# Patient Record
Sex: MALE | Race: WHITE | NOT HISPANIC OR LATINO | Employment: OTHER | URBAN - METROPOLITAN AREA
[De-identification: names, ages, dates, MRNs, and addresses within clinical notes are randomized per-mention and may not be internally consistent; named-entity substitution may affect disease eponyms.]

---

## 2019-08-01 ENCOUNTER — HOSPITAL ENCOUNTER (OUTPATIENT)
Facility: HOSPITAL | Age: 74
Discharge: HOME | End: 2019-08-02
Attending: EMERGENCY MEDICINE | Admitting: INTERNAL MEDICINE
Payer: MEDICARE

## 2019-08-01 DIAGNOSIS — I20.89 ANGINAL CHEST PAIN AT REST: ICD-10-CM

## 2019-08-01 DIAGNOSIS — R07.9 CHEST PAIN: Primary | ICD-10-CM

## 2019-08-01 PROBLEM — R13.10 DYSPHAGIA: Status: ACTIVE | Noted: 2019-08-01

## 2019-08-01 PROBLEM — N20.0 NEPHROLITHIASIS: Chronic | Status: ACTIVE | Noted: 2019-08-01

## 2019-08-01 PROBLEM — K57.30 DIVERTICULAR DISEASE OF COLON: Status: ACTIVE | Noted: 2019-08-01

## 2019-08-01 PROBLEM — J84.10 PULMONARY GRANULOMA: Status: ACTIVE | Noted: 2019-08-01

## 2019-08-01 PROBLEM — D69.6 THROMBOCYTOPENIA, UNSPECIFIED: Status: ACTIVE | Noted: 2019-08-01

## 2019-08-01 PROBLEM — D64.9 ANEMIA: Status: ACTIVE | Noted: 2019-08-01

## 2019-08-01 PROBLEM — K46.9 ABDOMINAL HERNIA: Status: ACTIVE | Noted: 2019-08-01

## 2019-08-01 LAB
ALBUMIN SERPL BCP-MCNC: 4 G/DL (ref 3.5–5.2)
ALP SERPL-CCNC: 47 U/L (ref 55–135)
ALT SERPL W/O P-5'-P-CCNC: 24 U/L (ref 10–44)
ANION GAP SERPL CALC-SCNC: 8 MMOL/L (ref 8–16)
AST SERPL-CCNC: 24 U/L (ref 10–40)
BASOPHILS # BLD AUTO: 0.04 K/UL (ref 0–0.2)
BASOPHILS NFR BLD: 0.6 % (ref 0–1.9)
BILIRUB SERPL-MCNC: 1.5 MG/DL (ref 0.1–1)
BILIRUB UR QL STRIP: NEGATIVE
BNP SERPL-MCNC: 95 PG/ML (ref 0–99)
BUN SERPL-MCNC: 16 MG/DL (ref 8–23)
CALCIUM SERPL-MCNC: 9.8 MG/DL (ref 8.7–10.5)
CHLORIDE SERPL-SCNC: 107 MMOL/L (ref 95–110)
CK MB SERPL-MCNC: 1.6 NG/ML (ref 0.1–6.5)
CK SERPL-CCNC: 41 U/L (ref 20–200)
CLARITY UR: CLEAR
CO2 SERPL-SCNC: 27 MMOL/L (ref 23–29)
COLOR UR: YELLOW
CREAT SERPL-MCNC: 1 MG/DL (ref 0.5–1.4)
DIFFERENTIAL METHOD: ABNORMAL
EOSINOPHIL # BLD AUTO: 0.2 K/UL (ref 0–0.5)
EOSINOPHIL NFR BLD: 2.4 % (ref 0–8)
ERYTHROCYTE [DISTWIDTH] IN BLOOD BY AUTOMATED COUNT: 11.9 % (ref 11.5–14.5)
EST. GFR  (AFRICAN AMERICAN): >60 ML/MIN/1.73 M^2
EST. GFR  (NON AFRICAN AMERICAN): >60 ML/MIN/1.73 M^2
GLUCOSE SERPL-MCNC: 87 MG/DL (ref 70–110)
GLUCOSE UR QL STRIP: NEGATIVE
HCT VFR BLD AUTO: 35.7 % (ref 40–54)
HGB BLD-MCNC: 12.4 G/DL (ref 14–18)
HGB UR QL STRIP: NEGATIVE
IMM GRANULOCYTES # BLD AUTO: 0.01 K/UL (ref 0–0.04)
IMM GRANULOCYTES NFR BLD AUTO: 0.2 % (ref 0–0.5)
KETONES UR QL STRIP: NEGATIVE
LEUKOCYTE ESTERASE UR QL STRIP: NEGATIVE
LYMPHOCYTES # BLD AUTO: 1.9 K/UL (ref 1–4.8)
LYMPHOCYTES NFR BLD: 29.3 % (ref 18–48)
MAGNESIUM SERPL-MCNC: 1.9 MG/DL (ref 1.6–2.6)
MCH RBC QN AUTO: 30.8 PG (ref 27–31)
MCHC RBC AUTO-ENTMCNC: 34.7 G/DL (ref 32–36)
MCV RBC AUTO: 89 FL (ref 82–98)
MONOCYTES # BLD AUTO: 0.7 K/UL (ref 0.3–1)
MONOCYTES NFR BLD: 10.9 % (ref 4–15)
NEUTROPHILS # BLD AUTO: 3.7 K/UL (ref 1.8–7.7)
NEUTROPHILS NFR BLD: 56.6 % (ref 38–73)
NITRITE UR QL STRIP: NEGATIVE
NRBC BLD-RTO: 0 /100 WBC
PH UR STRIP: 7 [PH] (ref 5–8)
PLATELET # BLD AUTO: 133 K/UL (ref 150–350)
PMV BLD AUTO: 12.3 FL (ref 9.2–12.9)
POTASSIUM SERPL-SCNC: 4.2 MMOL/L (ref 3.5–5.1)
PROT SERPL-MCNC: 7 G/DL (ref 6–8.4)
PROT UR QL STRIP: NEGATIVE
RBC # BLD AUTO: 4.03 M/UL (ref 4.6–6.2)
SODIUM SERPL-SCNC: 142 MMOL/L (ref 136–145)
SP GR UR STRIP: 1 (ref 1–1.03)
TROPONIN I SERPL DL<=0.01 NG/ML-MCNC: <0.03 NG/ML (ref 0.02–0.5)
TROPONIN I SERPL DL<=0.01 NG/ML-MCNC: <0.03 NG/ML (ref 0.02–0.5)
URN SPEC COLLECT METH UR: NORMAL
UROBILINOGEN UR STRIP-ACNC: NEGATIVE EU/DL
WBC # BLD AUTO: 6.59 K/UL (ref 3.9–12.7)

## 2019-08-01 PROCEDURE — 85025 COMPLETE CBC W/AUTO DIFF WBC: CPT

## 2019-08-01 PROCEDURE — 80053 COMPREHEN METABOLIC PANEL: CPT

## 2019-08-01 PROCEDURE — 25000003 PHARM REV CODE 250: Performed by: INTERNAL MEDICINE

## 2019-08-01 PROCEDURE — 63600175 PHARM REV CODE 636 W HCPCS: Performed by: INTERNAL MEDICINE

## 2019-08-01 PROCEDURE — 99285 EMERGENCY DEPT VISIT HI MDM: CPT | Mod: 25

## 2019-08-01 PROCEDURE — 84484 ASSAY OF TROPONIN QUANT: CPT | Mod: 91

## 2019-08-01 PROCEDURE — 83735 ASSAY OF MAGNESIUM: CPT

## 2019-08-01 PROCEDURE — G0378 HOSPITAL OBSERVATION PER HR: HCPCS

## 2019-08-01 PROCEDURE — 81003 URINALYSIS AUTO W/O SCOPE: CPT

## 2019-08-01 PROCEDURE — 82550 ASSAY OF CK (CPK): CPT

## 2019-08-01 PROCEDURE — 84484 ASSAY OF TROPONIN QUANT: CPT

## 2019-08-01 PROCEDURE — 82553 CREATINE MB FRACTION: CPT

## 2019-08-01 PROCEDURE — 93005 ELECTROCARDIOGRAM TRACING: CPT

## 2019-08-01 PROCEDURE — 83880 ASSAY OF NATRIURETIC PEPTIDE: CPT

## 2019-08-01 RX ORDER — CLOPIDOGREL BISULFATE 75 MG/1
75 TABLET ORAL DAILY
Status: DISCONTINUED | OUTPATIENT
Start: 2019-08-02 | End: 2019-08-02 | Stop reason: HOSPADM

## 2019-08-01 RX ORDER — MORPHINE SULFATE 4 MG/ML
4 INJECTION, SOLUTION INTRAMUSCULAR; INTRAVENOUS EVERY 4 HOURS PRN
Status: DISCONTINUED | OUTPATIENT
Start: 2019-08-01 | End: 2019-08-02 | Stop reason: HOSPADM

## 2019-08-01 RX ORDER — PANTOPRAZOLE SODIUM 40 MG/1
40 TABLET, DELAYED RELEASE ORAL DAILY
Status: DISCONTINUED | OUTPATIENT
Start: 2019-08-02 | End: 2019-08-02 | Stop reason: HOSPADM

## 2019-08-01 RX ORDER — TEMAZEPAM 7.5 MG/1
15 CAPSULE ORAL NIGHTLY PRN
COMMUNITY
End: 2021-10-21

## 2019-08-01 RX ORDER — MORPHINE SULFATE 2 MG/ML
2 INJECTION, SOLUTION INTRAMUSCULAR; INTRAVENOUS EVERY 4 HOURS PRN
Status: DISCONTINUED | OUTPATIENT
Start: 2019-08-01 | End: 2019-08-02 | Stop reason: HOSPADM

## 2019-08-01 RX ORDER — ISOSORBIDE MONONITRATE 60 MG/1
120 TABLET, EXTENDED RELEASE ORAL DAILY
Status: DISCONTINUED | OUTPATIENT
Start: 2019-08-02 | End: 2019-08-02 | Stop reason: HOSPADM

## 2019-08-01 RX ORDER — ATORVASTATIN CALCIUM 40 MG/1
40 TABLET, FILM COATED ORAL NIGHTLY
Status: DISCONTINUED | OUTPATIENT
Start: 2019-08-01 | End: 2019-08-02 | Stop reason: HOSPADM

## 2019-08-01 RX ORDER — ACETAMINOPHEN 325 MG/1
650 TABLET ORAL EVERY 4 HOURS PRN
Status: DISCONTINUED | OUTPATIENT
Start: 2019-08-01 | End: 2019-08-02 | Stop reason: HOSPADM

## 2019-08-01 RX ORDER — LISINOPRIL 5 MG/1
5 TABLET ORAL DAILY
Status: DISCONTINUED | OUTPATIENT
Start: 2019-08-02 | End: 2019-08-02 | Stop reason: HOSPADM

## 2019-08-01 RX ORDER — ASPIRIN 81 MG/1
81 TABLET ORAL DAILY
Status: DISCONTINUED | OUTPATIENT
Start: 2019-08-02 | End: 2019-08-02 | Stop reason: HOSPADM

## 2019-08-01 RX ORDER — ASPIRIN 325 MG
325 TABLET ORAL
Status: ACTIVE | OUTPATIENT
Start: 2019-08-01 | End: 2019-08-02

## 2019-08-01 RX ORDER — FLECAINIDE ACETATE 100 MG/1
100 TABLET ORAL EVERY 12 HOURS
Status: DISCONTINUED | OUTPATIENT
Start: 2019-08-01 | End: 2019-08-02 | Stop reason: HOSPADM

## 2019-08-01 RX ORDER — CITALOPRAM 20 MG/1
20 TABLET, FILM COATED ORAL DAILY
Status: DISCONTINUED | OUTPATIENT
Start: 2019-08-02 | End: 2019-08-02 | Stop reason: HOSPADM

## 2019-08-01 RX ORDER — CLOPIDOGREL BISULFATE 75 MG/1
75 TABLET ORAL DAILY
COMMUNITY
End: 2019-08-17 | Stop reason: HOSPADM

## 2019-08-01 RX ORDER — FOLIC ACID 1 MG/1
1 TABLET ORAL DAILY
Status: DISCONTINUED | OUTPATIENT
Start: 2019-08-02 | End: 2019-08-02 | Stop reason: HOSPADM

## 2019-08-01 RX ORDER — SODIUM CHLORIDE 0.9 % (FLUSH) 0.9 %
10 SYRINGE (ML) INJECTION
Status: DISCONTINUED | OUTPATIENT
Start: 2019-08-01 | End: 2019-08-02 | Stop reason: HOSPADM

## 2019-08-01 RX ORDER — TAMSULOSIN HYDROCHLORIDE 0.4 MG/1
0.4 CAPSULE ORAL DAILY
Status: DISCONTINUED | OUTPATIENT
Start: 2019-08-01 | End: 2019-08-02 | Stop reason: HOSPADM

## 2019-08-01 RX ADMIN — ATORVASTATIN CALCIUM 40 MG: 40 TABLET, FILM COATED ORAL at 11:08

## 2019-08-01 RX ADMIN — MORPHINE SULFATE 2 MG: 2 INJECTION, SOLUTION INTRAMUSCULAR; INTRAVENOUS at 11:08

## 2019-08-01 RX ADMIN — TAMSULOSIN HYDROCHLORIDE 0.4 MG: 0.4 CAPSULE ORAL at 11:08

## 2019-08-01 NOTE — ED NOTES
Presents to ER with c/o left flank pain that goes into left groin. Advised chest hurts when he moves around a lot. States he has been doing the yard work, house work and taking care of his bedridden wife, and has been under a lot of stress lately. also says has shortness of breath when he is up moving around. Said was seen here for similar issues 2 weeks ago. AAOx4

## 2019-08-01 NOTE — ED PROVIDER NOTES
Encounter Date: 8/1/2019       History     Chief Complaint   Patient presents with    Chest Pain     74-year-old male with a history of coronary artery disease status post CABG x2 with 7 cardiac stents presents today complaining of substernal chest pain patient reports currently substernal chest pain is described as a pressure and rates it as 3/10 patient reports pain is increased with activity and partially alleviated by rest.  Patient reports pain is consistent with previous heart attacks.  Patient reports that he has been in his usual state of health until about 2 days ago when these pains began.  Patient reports that pain has been worsening.  Patient also complains of pain in his left flank radiating down the left thigh which he attributes to sciatica.  Patient sees Dr. Grewal, he reports taking 1 baby aspirin but has not use nitroglycerin today        Review of patient's allergies indicates:  No Known Allergies  Past Medical History:   Diagnosis Date    Coronary artery disease     Encounter for blood transfusion     Hypertension      Past Surgical History:   Procedure Laterality Date    CARDIAC SURGERY      CABG X2    HERNIA REPAIR      VASCULAR SURGERY      sents X7     Family History   Problem Relation Age of Onset    Heart disease Father     Heart disease Brother     Cancer Brother     Hypertension Brother      Social History     Tobacco Use    Smoking status: Former Smoker     Packs/day: 0.25     Years: 50.00     Pack years: 12.50    Smokeless tobacco: Former User     Quit date: 7/2/2018    Tobacco comment: cigars   Substance Use Topics    Alcohol use: Not on file    Drug use: No     Review of Systems   Constitutional: Negative for fever.   HENT: Negative for congestion, rhinorrhea, sore throat and trouble swallowing.    Eyes: Negative for visual disturbance.   Respiratory: Negative for cough, chest tightness, shortness of breath and wheezing.    Cardiovascular: Positive for chest pain.  Negative for palpitations and leg swelling.   Gastrointestinal: Negative for abdominal distention, abdominal pain, constipation, diarrhea, nausea and vomiting.   Genitourinary: Positive for flank pain. Negative for difficulty urinating, dysuria and frequency.   Musculoskeletal: Negative for arthralgias, back pain, joint swelling and neck pain.   Skin: Negative for color change and rash.   Neurological: Negative for dizziness, syncope, speech difficulty, weakness, numbness and headaches.   All other systems reviewed and are negative.      Physical Exam     Initial Vitals [08/01/19 1703]   BP Pulse Resp Temp SpO2   132/81 79 20 98 °F (36.7 °C) 100 %      MAP       --         Physical Exam    Nursing note and vitals reviewed.  Constitutional: He appears well-developed and well-nourished. He is not diaphoretic. No distress.   HENT:   Head: Normocephalic and atraumatic.   Right Ear: External ear normal.   Left Ear: External ear normal.   Nose: Nose normal.   Mouth/Throat: Oropharynx is clear and moist. No oropharyngeal exudate.   Eyes: Conjunctivae and EOM are normal. Pupils are equal, round, and reactive to light. Right eye exhibits no discharge. Left eye exhibits no discharge. No scleral icterus.   Neck: Normal range of motion. Neck supple. No thyromegaly present. No tracheal deviation present. No JVD present.   Cardiovascular: Normal rate, regular rhythm, normal heart sounds and intact distal pulses. Exam reveals no gallop and no friction rub.    No murmur heard.  Pulmonary/Chest: Breath sounds normal. No stridor. No respiratory distress. He has no wheezes. He has no rhonchi. He has no rales. He exhibits no tenderness.   Abdominal: Soft. Bowel sounds are normal. He exhibits no distension and no mass. There is no tenderness. There is no rebound and no guarding.   Musculoskeletal: Normal range of motion. He exhibits no edema or tenderness.   Lymphadenopathy:     He has no cervical adenopathy.   Neurological: He is alert  and oriented to person, place, and time. He has normal strength and normal reflexes. He displays normal reflexes. No cranial nerve deficit or sensory deficit.   Skin: Skin is warm and dry. No rash noted. No erythema.         ED Course   Procedures  Labs Reviewed   CBC W/ AUTO DIFFERENTIAL - Abnormal; Notable for the following components:       Result Value    RBC 4.03 (*)     Hemoglobin 12.4 (*)     Hematocrit 35.7 (*)     Platelets 133 (*)     All other components within normal limits   COMPREHENSIVE METABOLIC PANEL - Abnormal; Notable for the following components:    Total Bilirubin 1.5 (*)     Alkaline Phosphatase 47 (*)     All other components within normal limits   TROPONIN I   B-TYPE NATRIURETIC PEPTIDE   MAGNESIUM   URINALYSIS    Narrative:     Collection Type->Urine, Clean Catch   B-TYPE NATRIURETIC PEPTIDE   PROTIME-INR   TSH   TROPONIN I   CK   CK-MB          Imaging Results          X-Ray Chest AP Portable (Final result)  Result time 08/01/19 19:24:00    Final result by Marvin Lee MD (08/01/19 19:24:00)                 Impression:      No acute pulmonary process.      Electronically signed by: Marvin Lee MD  Date:    08/01/2019  Time:    19:24             Narrative:    EXAMINATION:  XR CHEST AP PORTABLE    CLINICAL HISTORY:  Chest Pain;    COMPARISON:  07/08/2019    FINDINGS:  Cardiac silhouette size is stable from prior.  Patient is status post median sternotomy.  Hyperdense left lung nodule consistent with calcified granuloma is stable from prior.  No confluent airspace disease.  No large pleural effusion or pneumothorax.                                 Medical Decision Making:   History:   Old Medical Records: I decided to obtain old medical records.  Initial Assessment:   Emergent evaluation of a 74-year-old male presenting with chest pain differential diagnosis includes ACS, electrolyte abnormality, endocrine dysfunction, infection patient also has left flank pain differential diagnosis  includes kidney stone, sciatica, musculoskeletal pain                      Clinical Impression:       ICD-10-CM ICD-9-CM   1. Chest pain R07.9 786.50   2. Anginal chest pain at rest I20.8 413.0                                Juvenal Brown MD  08/02/19 0016

## 2019-08-02 ENCOUNTER — HOSPITAL ENCOUNTER (OUTPATIENT)
Dept: RADIOLOGY | Facility: HOSPITAL | Age: 74
Discharge: HOME | End: 2019-08-02
Attending: INTERNAL MEDICINE
Payer: MEDICARE

## 2019-08-02 ENCOUNTER — CLINICAL SUPPORT (OUTPATIENT)
Dept: CARDIOLOGY | Facility: HOSPITAL | Age: 74
End: 2019-08-02
Attending: INTERNAL MEDICINE
Payer: MEDICARE

## 2019-08-02 VITALS
WEIGHT: 196.56 LBS | HEART RATE: 62 BPM | SYSTOLIC BLOOD PRESSURE: 112 MMHG | BODY MASS INDEX: 30.85 KG/M2 | TEMPERATURE: 98 F | DIASTOLIC BLOOD PRESSURE: 71 MMHG | OXYGEN SATURATION: 98 % | HEIGHT: 67 IN | RESPIRATION RATE: 17 BRPM

## 2019-08-02 PROBLEM — R07.9 CHEST PAIN: Status: RESOLVED | Noted: 2019-08-01 | Resolved: 2019-08-02

## 2019-08-02 PROBLEM — N20.0 NEPHROLITHIASIS: Chronic | Status: RESOLVED | Noted: 2019-08-01 | Resolved: 2019-08-02

## 2019-08-02 LAB
CHOLEST SERPL-MCNC: 105 MG/DL (ref 120–199)
CHOLEST/HDLC SERPL: 3.5 {RATIO} (ref 2–5)
CV STRESS BASE HR: 52 BPM
DIASTOLIC BLOOD PRESSURE: 69 MMHG
HDLC SERPL-MCNC: 30 MG/DL (ref 40–75)
HDLC SERPL: 28.6 % (ref 20–50)
LDLC SERPL CALC-MCNC: 61.4 MG/DL (ref 63–159)
NONHDLC SERPL-MCNC: 75 MG/DL
OHS CV CPX 85 PERCENT MAX PREDICTED HEART RATE MALE: 124
OHS CV CPX MAX PREDICTED HEART RATE: 146
OHS CV CPX PATIENT IS FEMALE: 0
OHS CV CPX PATIENT IS MALE: 1
OHS CV CPX PEAK DIASTOLIC BLOOD PRESSURE: 52 MMHG
OHS CV CPX PEAK HEAR RATE: 91 BPM
OHS CV CPX PEAK RATE PRESSURE PRODUCT: 8008
OHS CV CPX PEAK SYSTOLIC BLOOD PRESSURE: 88 MMHG
OHS CV CPX PERCENT MAX PREDICTED HEART RATE ACHIEVED: 62
OHS CV CPX RATE PRESSURE PRODUCT PRESENTING: 5512
STRESS ECHO TARGET HR: 124.1 BPM
SYSTOLIC BLOOD PRESSURE: 106 MMHG
TRIGL SERPL-MCNC: 68 MG/DL (ref 30–150)
TROPONIN I SERPL DL<=0.01 NG/ML-MCNC: <0.03 NG/ML (ref 0.02–0.5)
TROPONIN I SERPL DL<=0.01 NG/ML-MCNC: <0.03 NG/ML (ref 0.02–0.5)

## 2019-08-02 PROCEDURE — 63600175 PHARM REV CODE 636 W HCPCS: Performed by: INTERNAL MEDICINE

## 2019-08-02 PROCEDURE — 84484 ASSAY OF TROPONIN QUANT: CPT | Mod: 91

## 2019-08-02 PROCEDURE — 84484 ASSAY OF TROPONIN QUANT: CPT

## 2019-08-02 PROCEDURE — A9502 TC99M TETROFOSMIN: HCPCS

## 2019-08-02 PROCEDURE — 94761 N-INVAS EAR/PLS OXIMETRY MLT: CPT

## 2019-08-02 PROCEDURE — G0378 HOSPITAL OBSERVATION PER HR: HCPCS

## 2019-08-02 PROCEDURE — 82962 GLUCOSE BLOOD TEST: CPT

## 2019-08-02 PROCEDURE — 25000003 PHARM REV CODE 250: Performed by: INTERNAL MEDICINE

## 2019-08-02 PROCEDURE — 93017 CV STRESS TEST TRACING ONLY: CPT

## 2019-08-02 PROCEDURE — 80061 LIPID PANEL: CPT

## 2019-08-02 RX ORDER — REGADENOSON 0.08 MG/ML
0.4 INJECTION, SOLUTION INTRAVENOUS ONCE
Status: COMPLETED | OUTPATIENT
Start: 2019-08-02 | End: 2019-08-02

## 2019-08-02 RX ADMIN — ASPIRIN 81 MG: 81 TABLET, COATED ORAL at 09:08

## 2019-08-02 RX ADMIN — FOLIC ACID 1 MG: 1 TABLET ORAL at 09:08

## 2019-08-02 RX ADMIN — CLOPIDOGREL BISULFATE 75 MG: 75 TABLET, FILM COATED ORAL at 09:08

## 2019-08-02 RX ADMIN — PANTOPRAZOLE SODIUM 40 MG: 40 TABLET, DELAYED RELEASE ORAL at 09:08

## 2019-08-02 RX ADMIN — CITALOPRAM HYDROBROMIDE 20 MG: 20 TABLET ORAL at 09:08

## 2019-08-02 RX ADMIN — MORPHINE SULFATE 4 MG: 4 INJECTION INTRAVENOUS at 09:08

## 2019-08-02 RX ADMIN — REGADENOSON 0.4 MG: 0.08 INJECTION, SOLUTION INTRAVENOUS at 11:08

## 2019-08-02 NOTE — CONSULTS
[facility]  Cardiology  Consult Note    Patient Name: Jovan Davila  MRN: [id number]  Admission Date: 8/1/2019  Hospital Length of Stay: 0 days  Code Status: Full Code   Attending Provider: Lidya Chaidez MD   Consulting Provider: Claude Grewal MD  Primary Care Physician: Jewel العلي MD  Principal Problem:Chest pain    Patient information was obtained from patient and ER records. Agree with stress test if negative ok dc and fu op.    Consults  Subjective:     Chief Complaint:  Chest pain     HPI: Patient presented to ER with complaint of chest pain on exertion  He has been having this pain which he describes as left sided below the breast  Aching without radiation reminds him of previous MI  He has a significant h/o cabg x2; he is on plavix imdur metoprolol, states his hr typically runs low  Denied nausea vomiting or diarrhea  states some flank discomfort and has a history of kidney stones but saw his urologist a couple of days ago and was told they are very small  His last admission in July 2019       Past Medical History:   Diagnosis Date    Coronary artery disease     Encounter for blood transfusion     Hypertension        Past Surgical History:   Procedure Laterality Date    CARDIAC SURGERY      CABG X2    HERNIA REPAIR      VASCULAR SURGERY      sents X7       Review of patient's allergies indicates:  No Known Allergies    No current facility-administered medications on file prior to encounter.      Current Outpatient Medications on File Prior to Encounter   Medication Sig    aspirin (ECOTRIN) 81 MG EC tablet Take 81 mg by mouth once daily.    atorvastatin (LIPITOR) 40 MG tablet Take 40 mg by mouth once daily.    citalopram (CELEXA) 40 MG tablet Take 20 mg by mouth once daily.     clopidogrel (PLAVIX) 75 mg tablet Take 75 mg by mouth once daily.    fish oil-omega-3 fatty acids 300-1,000 mg capsule Take 2 g by mouth once daily.    flecainide (TAMBOCOR) 50 MG Tab Take 100 mg by  mouth every 12 (twelve) hours.    folic acid (FOLVITE) 1 MG tablet Take 1 mg by mouth once daily.    isosorbide mononitrate (IMDUR) 120 MG 24 hr tablet Take 120 mg by mouth once daily.    lisinopril (PRINIVIL,ZESTRIL) 5 MG tablet Take 5 mg by mouth once daily.    multivitamin with minerals tablet Take 1 tablet by mouth once daily.    omeprazole (PRILOSEC) 40 MG capsule Take 40 mg by mouth once daily.    tamsulosin (FLOMAX) 0.4 mg Cp24 Take 0.4 mg by mouth once daily.    temazepam (RESTORIL) 7.5 MG Cap Take 15 mg by mouth nightly as needed.    gabapentin (NEURONTIN) 600 MG tablet Take 600 mg by mouth 2 (two) times daily.     Family History     Problem Relation (Age of Onset)    Cancer Brother    Heart disease Father, Brother    Hypertension Brother        Tobacco Use    Smoking status: Former Smoker     Packs/day: 0.25     Years: 50.00     Pack years: 12.50    Smokeless tobacco: Former User     Quit date: 7/2/2018    Tobacco comment: cigars   Substance and Sexual Activity    Alcohol use: Not on file    Drug use: No    Sexual activity: Yes     Partners: Female     Review of Systems   Constitution: Negative.   HENT: Negative.    Eyes: Negative.    Cardiovascular: Positive for chest pain.   Respiratory: Negative.    Endocrine: Negative.    Skin: Negative.    Musculoskeletal: Negative.      Objective:     Vital Signs (Most Recent):  Temp: 97.6 °F (36.4 °C) (08/02/19 1208)  Pulse: 62 (08/02/19 1208)  Resp: 17 (08/02/19 1208)  BP: 112/71 (08/02/19 1208)  SpO2: 98 % (08/02/19 1208) Vital Signs (24h Range):  Temp:  [97.5 °F (36.4 °C)-98 °F (36.7 °C)] 97.6 °F (36.4 °C)  Pulse:  [50-79] 62  Resp:  [17-40] 17  SpO2:  [97 %-100 %] 98 %  BP: (112-185)/(70-92) 112/71     Weight: 89.1 kg (196 lb 8.6 oz)  Body mass index is 30.78 kg/m².    SpO2: 98 %  O2 Device (Oxygen Therapy): room air      Intake/Output Summary (Last 24 hours) at 8/2/2019 1217  Last data filed at 8/2/2019 0826  Gross per 24 hour   Intake 700 ml    Output 652 ml   Net 48 ml       Lines/Drains/Airways     Peripheral Intravenous Line                 Peripheral IV - Single Lumen 08/01/19 1853 20 G Right Antecubital less than 1 day                Physical Exam   Constitutional: He is oriented to person, place, and time. He appears well-developed.   HENT:   Head: Normocephalic.   Cardiovascular: Normal rate.   Pulmonary/Chest: Effort normal.   Abdominal: Soft.   Neurological: He is alert and oriented to person, place, and time.       Significant Labs:   ABG: No results for input(s): PH, PCO2, HCO3, POCSATURATED, BE in the last 48 hours., BMP:   Recent Labs   Lab 08/01/19  1742   GLU 87      K 4.2      CO2 27   BUN 16   CREATININE 1.0   CALCIUM 9.8   MG 1.9   , CMP   Recent Labs   Lab 08/01/19  1742      K 4.2      CO2 27   GLU 87   BUN 16   CREATININE 1.0   CALCIUM 9.8   PROT 7.0   ALBUMIN 4.0   BILITOT 1.5*   ALKPHOS 47*   AST 24   ALT 24   ANIONGAP 8   ESTGFRAFRICA >60.0   EGFRNONAA >60.0   , CBC   Recent Labs   Lab 08/01/19  1743   WBC 6.59   HGB 12.4*   HCT 35.7*   *   , INR No results for input(s): INR, PROTIME in the last 48 hours., Lipid Panel   Recent Labs   Lab 08/02/19  0443   CHOL 105*   HDL 30*   LDLCALC 61.4*   TRIG 68   CHOLHDL 28.6    and Troponin   Recent Labs   Lab 08/01/19  2105 08/02/19  0035 08/02/19  0443   TROPONINI <0.030 <0.030 <0.030       Significant Imaging: X-Ray: CXR: X-Ray Chest 1 View (CXR): No results found for this visit on 08/01/19.  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 I will follow-up with patient. Please contact us if you have any additional questions.    Claude Grewal MD  Cardiology   [facility]

## 2019-08-02 NOTE — DISCHARGE SUMMARY
[facility] Medicine  Discharge Summary      Patient Name: Jovan Davila  MRN: [id number]  Admission Date: 8/1/2019  Hospital Length of Stay: 0 days  Discharge Date and Time:  08/02/2019 6:09 PM  Attending Physician: No att. providers found   Discharging Provider: Lidya Chaidez MD  Primary Care Provider: Jewel العلي MD      HPI:   Patient presented to ER with complaint of chest pain on exertion  He has been having this pain which he describes as left sided below the breast  Aching without radiation reminds him of previous MI  He has a significant h/o cabg x2; he is on plavix imdur metoprolol, states his hr typically runs low  Denied nausea vomiting or diarrhea  states some flank discomfort and has a history of kidney stones but saw his urologist a couple of days ago and was told they are very small  His last admission in July 2019     * No surgery found *      Hospital Course:   During his short hospital stay ACS was ruled out by serial EKGs, cardiac enzymes and negative stress test.  Patient was evaluated by Dr. Grewal cleared him for discharge. We did not make any changes to his medications.  He was advised to follow up with  in a week as well as with his PCP for his ongoing sciatic pain.      Physical Exam   Constitutional: He is oriented to person, place, and time. He appears well-developed and well-nourished.   HENT:   Head: Normocephalic and atraumatic.   Eyes: Pupils are equal, round, and reactive to light. Conjunctivae are normal.   Neck: Normal range of motion. Neck supple. No JVD present.   Cardiovascular: Normal rate and regular rhythm.   Pulmonary/Chest: Effort normal and breath sounds normal.   Abdominal: Soft. Bowel sounds are normal.   Musculoskeletal: Normal range of motion.   Neurological: He is alert and oriented to person, place, and time. He has normal reflexes.   Skin: Skin is warm and dry.   Psychiatric: He has a normal mood and affect. His behavior is  normal.   Nursing note and vitals reviewed.    Consults:   Consults (From admission, onward)        Status Ordering Provider     Inpatient consult to Cardiology  Once     Provider:  Claude Grewal MD    Completed RASHMI BURT          No new Assessment & Plan notes have been filed under this hospital service since the last note was generated.  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    Specialty:  Internal Medicine  Contact information:  [address] [facility] [place] [address] [phone]             Claude Gerwal MD In 1 week.    Specialty:  Cardiology  Contact information:  [address] [facility] [id number] [phone]                 Patient Instructions:      Diet diabetic     Activity as tolerated       Significant Diagnostic Studies:  Negative stress test    Pending Diagnostic Studies:     None         Medications:  Reconciled Home Medications:      Medication List      CONTINUE taking these medications    aspirin 81 MG EC tablet  Commonly known as:  ECOTRIN  Take 81 mg by mouth once daily.     atorvastatin 40 MG tablet  Commonly known as:  LIPITOR  Take 40 mg by mouth once daily.     citalopram 40 MG tablet  Commonly known as:  CELEXA  Take 20 mg by mouth once daily.     clopidogrel 75 mg tablet  Commonly  known as:  PLAVIX  Take 75 mg by mouth once daily.     fish oil-omega-3 fatty acids 300-1,000 mg capsule  Take 2 g by mouth once daily.     folic acid 1 MG tablet  Commonly known as:  FOLVITE  Take 1 mg by mouth once daily.     isosorbide mononitrate 120 MG 24 hr tablet  Commonly known as:  IMDUR  Take 120 mg by mouth once daily.     lisinopril 5 MG tablet  Commonly known as:  PRINIVIL,ZESTRIL  Take 5 mg by mouth once daily.     multivitamin with minerals tablet  Take 1 tablet by mouth once daily.     omeprazole 40 MG capsule  Commonly known as:  PRILOSEC  Take 40 mg by mouth once daily.     TAMBOCOR 50 MG Tab  Generic drug:  flecainide  Take 100 mg by mouth every 12 (twelve) hours.     tamsulosin 0.4 mg Cap  Commonly known as:  FLOMAX  Take 0.4 mg by mouth once daily.     temazepam 7.5 MG Cap  Commonly known as:  RESTORIL  Take 15 mg by mouth nightly as needed.        STOP taking these medications    gabapentin 600 MG tablet  Commonly known as:  NEURONTIN            Indwelling Lines/Drains at time of discharge:   Lines/Drains/Airways          None          Time spent on the discharge of patient: 25 minutes  Patient was seen and examined on the date of discharge and determined to be suitable for discharge.         Lidya Chaidez MD  Department of Hospital Medicine  [facility]

## 2019-08-02 NOTE — ASSESSMENT & PLAN NOTE
Anginal pain on exertion  - patient is on plavix, and beta blocker has some degree of bradycardia without AV block, so will resume if hr appropriate in am after stress test and evaluation by cardiologist  - asa statin bb; and is on lisinopril; is on plavix  - imdur  - trend CE  - ekg prn chest pain

## 2019-08-02 NOTE — SUBJECTIVE & OBJECTIVE
Past Medical History:   Diagnosis Date    Coronary artery disease     Encounter for blood transfusion     Hypertension        Past Surgical History:   Procedure Laterality Date    CARDIAC SURGERY      HERNIA REPAIR      VASCULAR SURGERY         Review of patient's allergies indicates:  No Known Allergies    No current facility-administered medications on file prior to encounter.      Current Outpatient Medications on File Prior to Encounter   Medication Sig    aspirin (ECOTRIN) 81 MG EC tablet Take 81 mg by mouth once daily.    atorvastatin (LIPITOR) 40 MG tablet Take 40 mg by mouth once daily.    citalopram (CELEXA) 40 MG tablet Take 20 mg by mouth once daily.     clopidogrel (PLAVIX) 75 mg tablet Take 75 mg by mouth once daily.    fish oil-omega-3 fatty acids 300-1,000 mg capsule Take 2 g by mouth once daily.    flecainide (TAMBOCOR) 50 MG Tab Take 100 mg by mouth every 12 (twelve) hours.    folic acid (FOLVITE) 1 MG tablet Take 1 mg by mouth once daily.    isosorbide mononitrate (IMDUR) 120 MG 24 hr tablet Take 120 mg by mouth once daily.    lisinopril (PRINIVIL,ZESTRIL) 5 MG tablet Take 5 mg by mouth once daily.    multivitamin with minerals tablet Take 1 tablet by mouth once daily.    omeprazole (PRILOSEC) 40 MG capsule Take 40 mg by mouth once daily.    tamsulosin (FLOMAX) 0.4 mg Cp24 Take 0.4 mg by mouth once daily.    temazepam (RESTORIL) 7.5 MG Cap Take 15 mg by mouth nightly as needed.    gabapentin (NEURONTIN) 600 MG tablet Take 600 mg by mouth 2 (two) times daily.     Family History     Problem Relation (Age of Onset)    Cancer Brother    Heart disease Father, Brother    Hypertension Brother        Tobacco Use    Smoking status: Former Smoker     Packs/day: 0.25     Years: 50.00     Pack years: 12.50    Tobacco comment: cigars   Substance and Sexual Activity    Alcohol use: No    Drug use: No    Sexual activity: Yes     Partners: Female     Review of Systems   Constitutional:  Negative for chills and fever.   HENT: Negative for sneezing and sore throat.    Eyes: Negative for discharge.   Respiratory: Negative for cough, chest tightness, shortness of breath and wheezing.    Cardiovascular: Positive for chest pain. Negative for palpitations and leg swelling.   Gastrointestinal: Negative for abdominal distention, abdominal pain, constipation, diarrhea, nausea and vomiting.   Endocrine: Negative for cold intolerance and heat intolerance.   Genitourinary: Negative for dysuria, flank pain, frequency and urgency.   Musculoskeletal: Negative for joint swelling and neck stiffness.   Skin: Negative for rash and wound.   Allergic/Immunologic: Negative for immunocompromised state.   Neurological: Negative for dizziness, tremors, syncope, speech difficulty, weakness, light-headedness and numbness.   Hematological: Negative for adenopathy. Does not bruise/bleed easily.   Psychiatric/Behavioral: Negative for agitation, confusion and suicidal ideas.   All other systems reviewed and are negative.    Objective:     Vital Signs (Most Recent):  Temp: 98 °F (36.7 °C) (08/01/19 1703)  Pulse: (!) 56 (08/01/19 1851)  Resp: (!) 31 (08/01/19 1851)  BP: (!) 153/73 (08/01/19 1851)  SpO2: 100 % (08/01/19 1851) Vital Signs (24h Range):  Temp:  [98 °F (36.7 °C)] 98 °F (36.7 °C)  Pulse:  [56-79] 56  Resp:  [20-31] 31  SpO2:  [100 %] 100 %  BP: (132-153)/(73-81) 153/73     Weight: 92.5 kg (204 lb)  Body mass index is 31.95 kg/m².    Physical Exam   Constitutional: He is oriented to person, place, and time. He appears well-developed and well-nourished. No distress.   HENT:   Head: Normocephalic and atraumatic.   Mouth/Throat: Oropharynx is clear and moist. No oropharyngeal exudate.   Eyes: Pupils are equal, round, and reactive to light. EOM are normal.   Neck: Normal range of motion. Neck supple. No JVD present. No tracheal deviation present.   Cardiovascular: Normal rate, regular rhythm and normal heart sounds. Exam  reveals no gallop and no friction rub.   No murmur heard.  Pulmonary/Chest: Effort normal and breath sounds normal. No stridor. No respiratory distress. He has no wheezes. He has no rales. He exhibits no tenderness.   Abdominal: Soft. Bowel sounds are normal. He exhibits no distension. There is no tenderness. There is no guarding.   Musculoskeletal: Normal range of motion.   Neurological: He is alert and oriented to person, place, and time. He displays normal reflexes. No cranial nerve deficit or sensory deficit. He exhibits normal muscle tone. Coordination normal.   Skin: Skin is warm and dry. Capillary refill takes less than 2 seconds. He is not diaphoretic.   Psychiatric: He has a normal mood and affect.         CRANIAL NERVES     CN III, IV, VI   Pupils are equal, round, and reactive to light.  Extraocular motions are normal.        Significant Labs:   A1C: No results for input(s): HGBA1C in the last 4320 hours.  ABGs: No results for input(s): PH, PCO2, HCO3, POCSATURATED, BE, TOTALHB, COHB, METHB, O2HB, POCFIO2 in the last 48 hours.  Bilirubin:   Recent Labs   Lab 08/01/19  1742   BILITOT 1.5*     Blood Culture: No results for input(s): LABBLOO in the last 48 hours.  BMP:   Recent Labs   Lab 08/01/19  1742   GLU 87      K 4.2      CO2 27   BUN 16   CREATININE 1.0   CALCIUM 9.8   MG 1.9     CBC:   Recent Labs   Lab 08/01/19  1743   WBC 6.59   HGB 12.4*   HCT 35.7*   *     CMP:   Recent Labs   Lab 08/01/19  1742      K 4.2      CO2 27   GLU 87   BUN 16   CREATININE 1.0   CALCIUM 9.8   PROT 7.0   ALBUMIN 4.0   BILITOT 1.5*   ALKPHOS 47*   AST 24   ALT 24   ANIONGAP 8   EGFRNONAA >60.0     Cardiac Markers:   Recent Labs   Lab 08/01/19  1743   BNP 95     Coagulation: No results for input(s): PT, INR, APTT in the last 48 hours.  Lactic Acid: No results for input(s): LACTATE in the last 48 hours.  Lipase: No results for input(s): LIPASE in the last 48 hours.  Lipid Panel: No results for  input(s): CHOL, HDL, LDLCALC, TRIG, CHOLHDL in the last 48 hours.  Magnesium:   Recent Labs   Lab 08/01/19  1742   MG 1.9     Pathology Results  (Last 10 years)    None        POCT Glucose: No results for input(s): POCTGLUCOSE in the last 48 hours.  Prealbumin: No results for input(s): PREALBUMIN in the last 48 hours.  Respiratory Culture: No results for input(s): GSRESP, RESPIRATORYC in the last 48 hours.  Troponin:   Recent Labs   Lab 08/01/19  1742   TROPONINI <0.030     TSH: No results for input(s): TSH in the last 4320 hours.  Urine Culture: No results for input(s): LABURIN in the last 48 hours.  Urine Studies:   Recent Labs   Lab 08/01/19 1910   COLORU Yellow   APPEARANCEUA Clear   PHUR 7.0   SPECGRAV 1.005   PROTEINUA Negative   GLUCUA Negative   KETONESU Negative   BILIRUBINUA Negative   OCCULTUA Negative   NITRITE Negative   UROBILINOGEN Negative   LEUKOCYTESUR Negative     Recent Lab Results       08/01/19  1910 08/01/19  1743   08/01/19  1742        Albumin     4.0     Alkaline Phosphatase     47     ALT     24     Anion Gap     8     Appearance, UA Clear         AST     24     Baso #   0.04       Basophil%   0.6       Bilirubin (UA) Negative         BILIRUBIN TOTAL     1.5  Comment:  For infants and newborns, interpretation of results should be based  on gestational age, weight and in agreement with clinical  observations.  Premature Infant recommended reference ranges:  Up to 24 hours.............<8.0 mg/dL  Up to 48 hours............<12.0 mg/dL  3-5 days..................<15.0 mg/dL  6-29 days.................<15.0 mg/dL       BNP   95  Comment:  Values of less than 100 pg/ml are consistent with non-CHF populations.       BUN, Bld     16     Calcium     9.8     Chloride     107     CO2     27     Color, UA Yellow         Creatinine     1.0     Differential Method   Automated       eGFR if      >60.0     eGFR if non      >60.0  Comment:  Calculation used to obtain the  estimated glomerular filtration  rate (eGFR) is the CKD-EPI equation.        Eos #   0.2       Eosinophil%   2.4       Glucose     87     Glucose, UA Negative         Gran # (ANC)   3.7       Gran%   56.6       Hematocrit   35.7       Hemoglobin   12.4       Immature Grans (Abs)   0.01  Comment:  Mild elevation in immature granulocytes is non specific and   can be seen in a variety of conditions including stress response,   acute inflammation, trauma and pregnancy. Correlation with other   laboratory and clinical findings is essential.         Immature Granulocytes   0.2       Ketones, UA Negative         Leukocytes, UA Negative         Lymph #   1.9       Lymph%   29.3       Magnesium     1.9     MCH   30.8       MCHC   34.7       MCV   89       Mono #   0.7       Mono%   10.9       MPV   12.3       NITRITE UA Negative         nRBC   0       Occult Blood UA Negative         pH, UA 7.0         Platelets   133       Potassium     4.2     PROTEIN TOTAL     7.0     Protein, UA Negative  Comment:  Recommend a 24 hour urine protein or a urine   protein/creatinine ratio if globulin induced proteinuria is  clinically suspected.           RBC   4.03       RDW   11.9       Sodium     142     Specific [place], UA 1.005         Specimen UA Urine, Clean Catch         Troponin I     <0.030     UROBILINOGEN UA Negative         WBC   6.59           All pertinent labs within the past 24 hours have been reviewed.    Significant Imaging: I have reviewed all pertinent imaging results/findings within the past 24 hours.   X-ray Chest Ap Portable    Result Date: 8/1/2019  EXAMINATION: XR CHEST AP PORTABLE CLINICAL HISTORY: Chest Pain; COMPARISON: 07/08/2019 FINDINGS: Cardiac silhouette size is stable from prior.  Patient is status post median sternotomy.  Hyperdense left lung nodule consistent with calcified granuloma is stable from prior.  No confluent airspace disease.  No large pleural effusion or pneumothorax.     No acute pulmonary  process. Electronically signed by: Marvin Lee MD Date:    08/01/2019 Time:    19:24

## 2019-08-02 NOTE — PROGRESS NOTES
Myocardial perfusion rest injection RAC IV at 07:21    -patient to stay NPO status    Susan sánchez

## 2019-08-02 NOTE — PROGRESS NOTES
Myocardial perfusion stress injection at 11:40, RAC IV  -patient released from NPO status    Xochitl mt

## 2019-08-02 NOTE — HOSPITAL COURSE
During his short hospital stay ACS was ruled out by serial EKGs, cardiac enzymes and negative stress test.  Patient was evaluated by Dr. Grewal cleared him for discharge. We did not make any changes to his medications.  He was advised to follow up with  in a week as well as with his PCP for his ongoing sciatic pain.

## 2019-08-02 NOTE — HPI
Patient presented to ER with complaint of chest pain on exertion  He has been having this pain which he describes as left sided below the breast  Aching without radiation reminds him of previous MI  He has a significant h/o cabg x2; he is on plavix imdur metoprolol, states his hr typically runs low  Denied nausea vomiting or diarrhea  states some flank discomfort and has a history of kidney stones but saw his urologist a couple of days ago and was told they are very small  His last admission in July 2019

## 2019-08-02 NOTE — PLAN OF CARE
Problem: Adult Inpatient Plan of Care  Goal: Plan of Care Review     08/02/19 0427   Plan of Care Review   Outcome Summary On going, continue to monitor.

## 2019-08-02 NOTE — H&P
[facility] Medicine  History & Physical    Patient Name: Jovan Davila  MRN: [id number]  Admission Date: 8/1/2019  Attending Physician: Shamir Michaels MD  Primary Care Provider: Jewel العلي MD         Patient information was obtained from patient and ER records.     Subjective:     Principal Problem:Chest pain    Chief Complaint:   Chief Complaint   Patient presents with    Chest Pain        HPI: Patient presented to ER with complaint of chest pain on exertion  He has been having this pain which he describes as left sided below the breast  Aching without radiation reminds him of previous MI  He has a significant h/o cabg x2; he is on plavix imdur metoprolol, states his hr typically runs low  Denied nausea vomiting or diarrhea  states some flank discomfort and has a history of kidney stones but saw his urologist a couple of days ago and was told they are very small  His last admission in July 2019     Past Medical History:   Diagnosis Date    Coronary artery disease     Encounter for blood transfusion     Hypertension        Past Surgical History:   Procedure Laterality Date    CARDIAC SURGERY      HERNIA REPAIR      VASCULAR SURGERY         Review of patient's allergies indicates:  No Known Allergies    No current facility-administered medications on file prior to encounter.      Current Outpatient Medications on File Prior to Encounter   Medication Sig    aspirin (ECOTRIN) 81 MG EC tablet Take 81 mg by mouth once daily.    atorvastatin (LIPITOR) 40 MG tablet Take 40 mg by mouth once daily.    citalopram (CELEXA) 40 MG tablet Take 20 mg by mouth once daily.     clopidogrel (PLAVIX) 75 mg tablet Take 75 mg by mouth once daily.    fish oil-omega-3 fatty acids 300-1,000 mg capsule Take 2 g by mouth once daily.    flecainide (TAMBOCOR) 50 MG Tab Take 100 mg by mouth every 12 (twelve) hours.    folic acid (FOLVITE) 1 MG tablet Take 1 mg by mouth once daily.    isosorbide  mononitrate (IMDUR) 120 MG 24 hr tablet Take 120 mg by mouth once daily.    lisinopril (PRINIVIL,ZESTRIL) 5 MG tablet Take 5 mg by mouth once daily.    multivitamin with minerals tablet Take 1 tablet by mouth once daily.    omeprazole (PRILOSEC) 40 MG capsule Take 40 mg by mouth once daily.    tamsulosin (FLOMAX) 0.4 mg Cp24 Take 0.4 mg by mouth once daily.    temazepam (RESTORIL) 7.5 MG Cap Take 15 mg by mouth nightly as needed.    gabapentin (NEURONTIN) 600 MG tablet Take 600 mg by mouth 2 (two) times daily.     Family History     Problem Relation (Age of Onset)    Cancer Brother    Heart disease Father, Brother    Hypertension Brother        Tobacco Use    Smoking status: Former Smoker     Packs/day: 0.25     Years: 50.00     Pack years: 12.50    Tobacco comment: cigars   Substance and Sexual Activity    Alcohol use: No    Drug use: No    Sexual activity: Yes     Partners: Female     Review of Systems   Constitutional: Negative for chills and fever.   HENT: Negative for sneezing and sore throat.    Eyes: Negative for discharge.   Respiratory: Negative for cough, chest tightness, shortness of breath and wheezing.    Cardiovascular: Positive for chest pain. Negative for palpitations and leg swelling.   Gastrointestinal: Negative for abdominal distention, abdominal pain, constipation, diarrhea, nausea and vomiting.   Endocrine: Negative for cold intolerance and heat intolerance.   Genitourinary: Negative for dysuria, flank pain, frequency and urgency.   Musculoskeletal: Negative for joint swelling and neck stiffness.   Skin: Negative for rash and wound.   Allergic/Immunologic: Negative for immunocompromised state.   Neurological: Negative for dizziness, tremors, syncope, speech difficulty, weakness, light-headedness and numbness.   Hematological: Negative for adenopathy. Does not bruise/bleed easily.   Psychiatric/Behavioral: Negative for agitation, confusion and suicidal ideas.   All other systems  reviewed and are negative.    Objective:     Vital Signs (Most Recent):  Temp: 98 °F (36.7 °C) (08/01/19 1703)  Pulse: (!) 56 (08/01/19 1851)  Resp: (!) 31 (08/01/19 1851)  BP: (!) 153/73 (08/01/19 1851)  SpO2: 100 % (08/01/19 1851) Vital Signs (24h Range):  Temp:  [98 °F (36.7 °C)] 98 °F (36.7 °C)  Pulse:  [56-79] 56  Resp:  [20-31] 31  SpO2:  [100 %] 100 %  BP: (132-153)/(73-81) 153/73     Weight: 92.5 kg (204 lb)  Body mass index is 31.95 kg/m².    Physical Exam   Constitutional: He is oriented to person, place, and time. He appears well-developed and well-nourished. No distress.   HENT:   Head: Normocephalic and atraumatic.   Mouth/Throat: Oropharynx is clear and moist. No oropharyngeal exudate.   Eyes: Pupils are equal, round, and reactive to light. EOM are normal.   Neck: Normal range of motion. Neck supple. No JVD present. No tracheal deviation present.   Cardiovascular: Normal rate, regular rhythm and normal heart sounds. Exam reveals no gallop and no friction rub.   No murmur heard.  Pulmonary/Chest: Effort normal and breath sounds normal. No stridor. No respiratory distress. He has no wheezes. He has no rales. He exhibits no tenderness.   Abdominal: Soft. Bowel sounds are normal. He exhibits no distension. There is no tenderness. There is no guarding.   Musculoskeletal: Normal range of motion.   Neurological: He is alert and oriented to person, place, and time. He displays normal reflexes. No cranial nerve deficit or sensory deficit. He exhibits normal muscle tone. Coordination normal.   Skin: Skin is warm and dry. Capillary refill takes less than 2 seconds. He is not diaphoretic.   Psychiatric: He has a normal mood and affect.         CRANIAL NERVES     CN III, IV, VI   Pupils are equal, round, and reactive to light.  Extraocular motions are normal.        Significant Labs:   A1C: No results for input(s): HGBA1C in the last 4320 hours.  ABGs: No results for input(s): PH, PCO2, HCO3, POCSATURATED, BE,  TOTALHB, COHB, METHB, O2HB, POCFIO2 in the last 48 hours.  Bilirubin:   Recent Labs   Lab 08/01/19 1742   BILITOT 1.5*     Blood Culture: No results for input(s): LABBLOO in the last 48 hours.  BMP:   Recent Labs   Lab 08/01/19 1742   GLU 87      K 4.2      CO2 27   BUN 16   CREATININE 1.0   CALCIUM 9.8   MG 1.9     CBC:   Recent Labs   Lab 08/01/19 1743   WBC 6.59   HGB 12.4*   HCT 35.7*   *     CMP:   Recent Labs   Lab 08/01/19 1742      K 4.2      CO2 27   GLU 87   BUN 16   CREATININE 1.0   CALCIUM 9.8   PROT 7.0   ALBUMIN 4.0   BILITOT 1.5*   ALKPHOS 47*   AST 24   ALT 24   ANIONGAP 8   EGFRNONAA >60.0     Cardiac Markers:   Recent Labs   Lab 08/01/19 1743   BNP 95     Coagulation: No results for input(s): PT, INR, APTT in the last 48 hours.  Lactic Acid: No results for input(s): LACTATE in the last 48 hours.  Lipase: No results for input(s): LIPASE in the last 48 hours.  Lipid Panel: No results for input(s): CHOL, HDL, LDLCALC, TRIG, CHOLHDL in the last 48 hours.  Magnesium:   Recent Labs   Lab 08/01/19 1742   MG 1.9     Pathology Results  (Last 10 years)    None        POCT Glucose: No results for input(s): POCTGLUCOSE in the last 48 hours.  Prealbumin: No results for input(s): PREALBUMIN in the last 48 hours.  Respiratory Culture: No results for input(s): GSRESP, RESPIRATORYC in the last 48 hours.  Troponin:   Recent Labs   Lab 08/01/19 1742   TROPONINI <0.030     TSH: No results for input(s): TSH in the last 4320 hours.  Urine Culture: No results for input(s): LABURIN in the last 48 hours.  Urine Studies:   Recent Labs   Lab 08/01/19 1910   COLORU Yellow   APPEARANCEUA Clear   PHUR 7.0   SPECGRAV 1.005   PROTEINUA Negative   GLUCUA Negative   KETONESU Negative   BILIRUBINUA Negative   OCCULTUA Negative   NITRITE Negative   UROBILINOGEN Negative   LEUKOCYTESUR Negative     Recent Lab Results       08/01/19 1910 08/01/19 1743   08/01/19  1742        Albumin     4.0      Alkaline Phosphatase     47     ALT     24     Anion Gap     8     Appearance, UA Clear         AST     24     Baso #   0.04       Basophil%   0.6       Bilirubin (UA) Negative         BILIRUBIN TOTAL     1.5  Comment:  For infants and newborns, interpretation of results should be based  on gestational age, weight and in agreement with clinical  observations.  Premature Infant recommended reference ranges:  Up to 24 hours.............<8.0 mg/dL  Up to 48 hours............<12.0 mg/dL  3-5 days..................<15.0 mg/dL  6-29 days.................<15.0 mg/dL       BNP   95  Comment:  Values of less than 100 pg/ml are consistent with non-CHF populations.       BUN, Bld     16     Calcium     9.8     Chloride     107     CO2     27     Color, UA Yellow         Creatinine     1.0     Differential Method   Automated       eGFR if      >60.0     eGFR if non      >60.0  Comment:  Calculation used to obtain the estimated glomerular filtration  rate (eGFR) is the CKD-EPI equation.        Eos #   0.2       Eosinophil%   2.4       Glucose     87     Glucose, UA Negative         Gran # (ANC)   3.7       Gran%   56.6       Hematocrit   35.7       Hemoglobin   12.4       Immature Grans (Abs)   0.01  Comment:  Mild elevation in immature granulocytes is non specific and   can be seen in a variety of conditions including stress response,   acute inflammation, trauma and pregnancy. Correlation with other   laboratory and clinical findings is essential.         Immature Granulocytes   0.2       Ketones, UA Negative         Leukocytes, UA Negative         Lymph #   1.9       Lymph%   29.3       Magnesium     1.9     MCH   30.8       MCHC   34.7       MCV   89       Mono #   0.7       Mono%   10.9       MPV   12.3       NITRITE UA Negative         nRBC   0       Occult Blood UA Negative         pH, UA 7.0         Platelets   133       Potassium     4.2     PROTEIN TOTAL     7.0     Protein, UA  Negative  Comment:  Recommend a 24 hour urine protein or a urine   protein/creatinine ratio if globulin induced proteinuria is  clinically suspected.           RBC   4.03       RDW   11.9       Sodium     142     Specific [place], UA 1.005         Specimen UA Urine, Clean Catch         Troponin I     <0.030     UROBILINOGEN UA Negative         WBC   6.59           All pertinent labs within the past 24 hours have been reviewed.    Significant Imaging: I have reviewed all pertinent imaging results/findings within the past 24 hours.   X-ray Chest Ap Portable    Result Date: 8/1/2019  EXAMINATION: XR CHEST AP PORTABLE CLINICAL HISTORY: Chest Pain; COMPARISON: 07/08/2019 FINDINGS: Cardiac silhouette size is stable from prior.  Patient is status post median sternotomy.  Hyperdense left lung nodule consistent with calcified granuloma is stable from prior.  No confluent airspace disease.  No large pleural effusion or pneumothorax.     No acute pulmonary process. Electronically signed by: Marvin Lee MD Date:    08/01/2019 Time:    19:24      Assessment/Plan:     Pulmonary granuloma  Stable on this admission imaging in comparison to previous study      Thrombocytopenia, unspecified  - patient is on both asa and plavix, monitor      Anemia  - patient is on plavix, monitor      Bradycardia  - monitor on tele  - cardiologist is consulted      Essential hypertension  Continue current guideline recommended treatment      Coronary artery disease  Anginal pain on exertion  - patient is on plavix, and beta blocker has some degree of bradycardia without AV block, so will resume if hr appropriate in am after stress test and evaluation by cardiologist  - asa statin bb; and is on lisinopril; is on plavix  - imdur  - trend CE  - ekg prn chest pain      VTE Risk Mitigation (From admission, onward)    None             Shamir Michaels MD  Department of Hospital Medicine   [facility]

## 2019-08-17 ENCOUNTER — HOSPITAL ENCOUNTER (INPATIENT)
Facility: HOSPITAL | Age: 74
LOS: 6 days | Discharge: HOME | DRG: 394 | End: 2019-08-23
Attending: INTERNAL MEDICINE | Admitting: INTERNAL MEDICINE
Payer: MEDICARE

## 2019-08-17 ENCOUNTER — HOSPITAL ENCOUNTER (OUTPATIENT)
Facility: HOSPITAL | Age: 74
Discharge: OTHER INSTITUTION | End: 2019-08-17
Attending: EMERGENCY MEDICINE | Admitting: INTERNAL MEDICINE
Payer: MEDICARE

## 2019-08-17 VITALS
BODY MASS INDEX: 31.39 KG/M2 | TEMPERATURE: 98 F | WEIGHT: 200 LBS | SYSTOLIC BLOOD PRESSURE: 129 MMHG | DIASTOLIC BLOOD PRESSURE: 61 MMHG | HEIGHT: 67 IN | OXYGEN SATURATION: 100 % | RESPIRATION RATE: 19 BRPM | HEART RATE: 63 BPM

## 2019-08-17 DIAGNOSIS — I49.3 FREQUENT PVCS: ICD-10-CM

## 2019-08-17 DIAGNOSIS — K92.2 GASTROINTESTINAL HEMORRHAGE, UNSPECIFIED GASTROINTESTINAL HEMORRHAGE TYPE: Primary | ICD-10-CM

## 2019-08-17 DIAGNOSIS — R06.02 SOB (SHORTNESS OF BREATH): ICD-10-CM

## 2019-08-17 DIAGNOSIS — D62 ACUTE BLOOD LOSS ANEMIA: ICD-10-CM

## 2019-08-17 DIAGNOSIS — K92.2 GI BLEED: ICD-10-CM

## 2019-08-17 PROBLEM — D69.6 THROMBOCYTOPENIA, UNSPECIFIED: Chronic | Status: ACTIVE | Noted: 2019-08-01

## 2019-08-17 PROBLEM — J84.10 PULMONARY GRANULOMA: Chronic | Status: ACTIVE | Noted: 2019-08-01

## 2019-08-17 PROBLEM — K57.30 DIVERTICULAR DISEASE OF COLON: Chronic | Status: ACTIVE | Noted: 2019-08-01

## 2019-08-17 PROBLEM — K46.9 ABDOMINAL HERNIA: Chronic | Status: ACTIVE | Noted: 2019-08-01

## 2019-08-17 PROBLEM — D64.9 ANEMIA: Chronic | Status: ACTIVE | Noted: 2019-08-01

## 2019-08-17 PROBLEM — I10 HYPERTENSION: Status: ACTIVE | Noted: 2019-08-17

## 2019-08-17 PROBLEM — R13.10 DYSPHAGIA: Chronic | Status: ACTIVE | Noted: 2019-08-01

## 2019-08-17 LAB
ABO + RH BLD: NORMAL
ALBUMIN SERPL BCP-MCNC: 2.4 G/DL (ref 3.5–5.2)
ALBUMIN SERPL BCP-MCNC: 3.3 G/DL (ref 3.5–5.2)
ALP SERPL-CCNC: 41 U/L (ref 55–135)
ALP SERPL-CCNC: 41 U/L (ref 55–135)
ALT SERPL W/O P-5'-P-CCNC: 16 U/L (ref 10–44)
ALT SERPL W/O P-5'-P-CCNC: 21 U/L (ref 10–44)
AMYLASE SERPL-CCNC: 29 U/L (ref 20–110)
ANION GAP SERPL CALC-SCNC: 18 MMOL/L (ref 8–16)
ANION GAP SERPL CALC-SCNC: 6 MMOL/L (ref 8–16)
ANION GAP SERPL CALC-SCNC: 7 MMOL/L (ref 8–16)
AST SERPL-CCNC: 17 U/L (ref 10–40)
AST SERPL-CCNC: 22 U/L (ref 10–40)
BASOPHILS # BLD AUTO: 0.03 K/UL (ref 0–0.2)
BASOPHILS # BLD AUTO: 0.04 K/UL (ref 0–0.2)
BASOPHILS # BLD AUTO: 0.04 K/UL (ref 0–0.2)
BASOPHILS NFR BLD: 0.3 % (ref 0–1.9)
BASOPHILS NFR BLD: 0.3 % (ref 0–1.9)
BASOPHILS NFR BLD: 0.7 % (ref 0–1.9)
BILIRUB SERPL-MCNC: 0.6 MG/DL (ref 0.1–1)
BILIRUB SERPL-MCNC: 1.8 MG/DL (ref 0.1–1)
BLD GP AB SCN CELLS X3 SERPL QL: NORMAL
BLD PROD TYP BPU: NORMAL
BLOOD UNIT EXPIRATION DATE: NORMAL
BLOOD UNIT TYPE CODE: 5100
BLOOD UNIT TYPE: NORMAL
BUN SERPL-MCNC: 11 MG/DL (ref 8–23)
BUN SERPL-MCNC: 12 MG/DL (ref 8–23)
BUN SERPL-MCNC: 9 MG/DL (ref 6–30)
CALCIUM SERPL-MCNC: 8.1 MG/DL (ref 8.7–10.5)
CALCIUM SERPL-MCNC: 9 MG/DL (ref 8.7–10.5)
CHLORIDE SERPL-SCNC: 106 MMOL/L (ref 95–110)
CHLORIDE SERPL-SCNC: 109 MMOL/L (ref 95–110)
CHLORIDE SERPL-SCNC: 113 MMOL/L (ref 95–110)
CK SERPL-CCNC: 39 U/L (ref 20–200)
CO2 SERPL-SCNC: 22 MMOL/L (ref 23–29)
CO2 SERPL-SCNC: 26 MMOL/L (ref 23–29)
CODING SYSTEM: NORMAL
CREAT SERPL-MCNC: 0.8 MG/DL (ref 0.5–1.4)
DIFFERENTIAL METHOD: ABNORMAL
DISPENSE STATUS: NORMAL
EOSINOPHIL # BLD AUTO: 0 K/UL (ref 0–0.5)
EOSINOPHIL # BLD AUTO: 0 K/UL (ref 0–0.5)
EOSINOPHIL # BLD AUTO: 0.1 K/UL (ref 0–0.5)
EOSINOPHIL NFR BLD: 0.2 % (ref 0–8)
EOSINOPHIL NFR BLD: 0.2 % (ref 0–8)
EOSINOPHIL NFR BLD: 3.2 % (ref 0–8)
ERYTHROCYTE [DISTWIDTH] IN BLOOD BY AUTOMATED COUNT: 12.1 % (ref 11.5–14.5)
ERYTHROCYTE [DISTWIDTH] IN BLOOD BY AUTOMATED COUNT: 13.6 % (ref 11.5–14.5)
ERYTHROCYTE [DISTWIDTH] IN BLOOD BY AUTOMATED COUNT: 13.6 % (ref 11.5–14.5)
EST. GFR  (AFRICAN AMERICAN): >60 ML/MIN/1.73 M^2
EST. GFR  (AFRICAN AMERICAN): >60 ML/MIN/1.73 M^2
EST. GFR  (NON AFRICAN AMERICAN): >60 ML/MIN/1.73 M^2
EST. GFR  (NON AFRICAN AMERICAN): >60 ML/MIN/1.73 M^2
GLUCOSE SERPL-MCNC: 100 MG/DL (ref 70–110)
GLUCOSE SERPL-MCNC: 115 MG/DL (ref 70–110)
GLUCOSE SERPL-MCNC: 125 MG/DL (ref 70–110)
HCT VFR BLD AUTO: 30.6 % (ref 40–54)
HCT VFR BLD AUTO: 32.3 % (ref 40–54)
HCT VFR BLD AUTO: 32.3 % (ref 40–54)
HCT VFR BLD CALC: 19 %PCV (ref 36–54)
HGB BLD-MCNC: 10.3 G/DL (ref 14–18)
HGB BLD-MCNC: 10.6 G/DL (ref 14–18)
HGB BLD-MCNC: 10.6 G/DL (ref 14–18)
IMM GRANULOCYTES # BLD AUTO: 0.01 K/UL (ref 0–0.04)
IMM GRANULOCYTES # BLD AUTO: 0.05 K/UL (ref 0–0.04)
IMM GRANULOCYTES # BLD AUTO: 0.05 K/UL (ref 0–0.04)
IMM GRANULOCYTES NFR BLD AUTO: 0.2 % (ref 0–0.5)
IMM GRANULOCYTES NFR BLD AUTO: 0.4 % (ref 0–0.5)
IMM GRANULOCYTES NFR BLD AUTO: 0.4 % (ref 0–0.5)
INR PPP: 1.1
INR PPP: 1.1 (ref 0.8–1.2)
LACTATE SERPL-SCNC: 2.1 MMOL/L (ref 0.5–2.2)
LIPASE SERPL-CCNC: 29 U/L (ref 4–60)
LYMPHOCYTES # BLD AUTO: 1.1 K/UL (ref 1–4.8)
LYMPHOCYTES # BLD AUTO: 1.1 K/UL (ref 1–4.8)
LYMPHOCYTES # BLD AUTO: 1.2 K/UL (ref 1–4.8)
LYMPHOCYTES NFR BLD: 25.9 % (ref 18–48)
LYMPHOCYTES NFR BLD: 9.2 % (ref 18–48)
LYMPHOCYTES NFR BLD: 9.2 % (ref 18–48)
MAGNESIUM SERPL-MCNC: 1.6 MG/DL (ref 1.6–2.6)
MAGNESIUM SERPL-MCNC: 1.7 MG/DL (ref 1.6–2.6)
MCH RBC QN AUTO: 29.4 PG (ref 27–31)
MCH RBC QN AUTO: 29.4 PG (ref 27–31)
MCH RBC QN AUTO: 30.5 PG (ref 27–31)
MCHC RBC AUTO-ENTMCNC: 32.8 G/DL (ref 32–36)
MCHC RBC AUTO-ENTMCNC: 32.8 G/DL (ref 32–36)
MCHC RBC AUTO-ENTMCNC: 33.7 G/DL (ref 32–36)
MCV RBC AUTO: 90 FL (ref 82–98)
MCV RBC AUTO: 90 FL (ref 82–98)
MCV RBC AUTO: 91 FL (ref 82–98)
MONOCYTES # BLD AUTO: 0.6 K/UL (ref 0.3–1)
MONOCYTES # BLD AUTO: 0.7 K/UL (ref 0.3–1)
MONOCYTES # BLD AUTO: 0.7 K/UL (ref 0.3–1)
MONOCYTES NFR BLD: 12.6 % (ref 4–15)
MONOCYTES NFR BLD: 5.6 % (ref 4–15)
MONOCYTES NFR BLD: 5.6 % (ref 4–15)
NEUTROPHILS # BLD AUTO: 10.4 K/UL (ref 1.8–7.7)
NEUTROPHILS # BLD AUTO: 10.4 K/UL (ref 1.8–7.7)
NEUTROPHILS # BLD AUTO: 2.6 K/UL (ref 1.8–7.7)
NEUTROPHILS NFR BLD: 57.4 % (ref 38–73)
NEUTROPHILS NFR BLD: 84.3 % (ref 38–73)
NEUTROPHILS NFR BLD: 84.3 % (ref 38–73)
NRBC BLD-RTO: 0 /100 WBC
NUM UNITS TRANS PACKED RBC: NORMAL
OB PNL STL: POSITIVE
PHOSPHATE SERPL-MCNC: 2.8 MG/DL (ref 2.7–4.5)
PLATELET # BLD AUTO: 112 K/UL (ref 150–350)
PLATELET # BLD AUTO: 112 K/UL (ref 150–350)
PLATELET # BLD AUTO: 123 K/UL (ref 150–350)
PMV BLD AUTO: 12.2 FL (ref 9.2–12.9)
PMV BLD AUTO: 12.6 FL (ref 9.2–12.9)
PMV BLD AUTO: 12.6 FL (ref 9.2–12.9)
POC IONIZED CALCIUM: 1.18 MMOL/L (ref 1.06–1.42)
POC TCO2 (MEASURED): 21 MMOL/L (ref 23–29)
POTASSIUM BLD-SCNC: 3.7 MMOL/L (ref 3.5–5.1)
POTASSIUM SERPL-SCNC: 4.3 MMOL/L (ref 3.5–5.1)
POTASSIUM SERPL-SCNC: 4.4 MMOL/L (ref 3.5–5.1)
PROT SERPL-MCNC: 4.5 G/DL (ref 6–8.4)
PROT SERPL-MCNC: 5.9 G/DL (ref 6–8.4)
PROTHROMBIN TIME: 11.5 SEC (ref 9–12.5)
PROTHROMBIN TIME: 13.5 SEC (ref 11.7–14)
RBC # BLD AUTO: 3.38 M/UL (ref 4.6–6.2)
RBC # BLD AUTO: 3.61 M/UL (ref 4.6–6.2)
RBC # BLD AUTO: 3.61 M/UL (ref 4.6–6.2)
SAMPLE: ABNORMAL
SODIUM BLD-SCNC: 141 MMOL/L (ref 136–145)
SODIUM SERPL-SCNC: 141 MMOL/L (ref 136–145)
SODIUM SERPL-SCNC: 142 MMOL/L (ref 136–145)
T4 FREE SERPL-MCNC: 2.19 NG/DL (ref 0.71–1.51)
TROPONIN I SERPL DL<=0.01 NG/ML-MCNC: <0.03 NG/ML (ref 0.02–0.04)
TSH SERPL DL<=0.005 MIU/L-ACNC: <0.01 UIU/ML (ref 0.34–5.6)
WBC # BLD AUTO: 12.38 K/UL (ref 3.9–12.7)
WBC # BLD AUTO: 12.38 K/UL (ref 3.9–12.7)
WBC # BLD AUTO: 4.44 K/UL (ref 3.9–12.7)

## 2019-08-17 PROCEDURE — 85610 PROTHROMBIN TIME: CPT

## 2019-08-17 PROCEDURE — 82150 ASSAY OF AMYLASE: CPT

## 2019-08-17 PROCEDURE — 96374 THER/PROPH/DIAG INJ IV PUSH: CPT

## 2019-08-17 PROCEDURE — 82272 OCCULT BLD FECES 1-3 TESTS: CPT

## 2019-08-17 PROCEDURE — 25500020 PHARM REV CODE 255: Performed by: EMERGENCY MEDICINE

## 2019-08-17 PROCEDURE — 84484 ASSAY OF TROPONIN QUANT: CPT

## 2019-08-17 PROCEDURE — 80053 COMPREHEN METABOLIC PANEL: CPT | Mod: 91

## 2019-08-17 PROCEDURE — 85610 PROTHROMBIN TIME: CPT | Mod: 91

## 2019-08-17 PROCEDURE — 86901 BLOOD TYPING SEROLOGIC RH(D): CPT | Mod: 91

## 2019-08-17 PROCEDURE — 83690 ASSAY OF LIPASE: CPT

## 2019-08-17 PROCEDURE — 83605 ASSAY OF LACTIC ACID: CPT

## 2019-08-17 PROCEDURE — C9113 INJ PANTOPRAZOLE SODIUM, VIA: HCPCS | Performed by: EMERGENCY MEDICINE

## 2019-08-17 PROCEDURE — 99285 EMERGENCY DEPT VISIT HI MDM: CPT | Mod: 25

## 2019-08-17 PROCEDURE — 83735 ASSAY OF MAGNESIUM: CPT

## 2019-08-17 PROCEDURE — 36430 TRANSFUSION BLD/BLD COMPNT: CPT

## 2019-08-17 PROCEDURE — 84439 ASSAY OF FREE THYROXINE: CPT

## 2019-08-17 PROCEDURE — 80053 COMPREHEN METABOLIC PANEL: CPT

## 2019-08-17 PROCEDURE — 63600175 PHARM REV CODE 636 W HCPCS: Performed by: EMERGENCY MEDICINE

## 2019-08-17 PROCEDURE — 20000000 HC ICU ROOM

## 2019-08-17 PROCEDURE — 86920 COMPATIBILITY TEST SPIN: CPT

## 2019-08-17 PROCEDURE — 93005 ELECTROCARDIOGRAM TRACING: CPT

## 2019-08-17 PROCEDURE — 84443 ASSAY THYROID STIM HORMONE: CPT

## 2019-08-17 PROCEDURE — 36415 COLL VENOUS BLD VENIPUNCTURE: CPT

## 2019-08-17 PROCEDURE — 85025 COMPLETE CBC W/AUTO DIFF WBC: CPT | Mod: 91

## 2019-08-17 PROCEDURE — 82550 ASSAY OF CK (CPK): CPT

## 2019-08-17 PROCEDURE — 83735 ASSAY OF MAGNESIUM: CPT | Mod: 91

## 2019-08-17 PROCEDURE — 84100 ASSAY OF PHOSPHORUS: CPT

## 2019-08-17 PROCEDURE — 85025 COMPLETE CBC W/AUTO DIFF WBC: CPT

## 2019-08-17 PROCEDURE — P9016 RBC LEUKOCYTES REDUCED: HCPCS

## 2019-08-17 PROCEDURE — 86901 BLOOD TYPING SEROLOGIC RH(D): CPT

## 2019-08-17 RX ORDER — TAMSULOSIN HYDROCHLORIDE 0.4 MG/1
0.4 CAPSULE ORAL DAILY
Status: CANCELLED | OUTPATIENT
Start: 2019-08-18

## 2019-08-17 RX ORDER — MELOXICAM 7.5 MG/1
7.5 TABLET ORAL DAILY
COMMUNITY
End: 2019-08-17

## 2019-08-17 RX ORDER — PANTOPRAZOLE SODIUM 40 MG/10ML
80 INJECTION, POWDER, LYOPHILIZED, FOR SOLUTION INTRAVENOUS ONCE
Status: COMPLETED | OUTPATIENT
Start: 2019-08-18 | End: 2019-08-18

## 2019-08-17 RX ORDER — HYDROCODONE BITARTRATE AND ACETAMINOPHEN 500; 5 MG/1; MG/1
TABLET ORAL
Status: DISCONTINUED | OUTPATIENT
Start: 2019-08-17 | End: 2019-08-17 | Stop reason: HOSPADM

## 2019-08-17 RX ORDER — ACETAMINOPHEN 325 MG/1
650 TABLET ORAL EVERY 4 HOURS PRN
Status: CANCELLED | OUTPATIENT
Start: 2019-08-17

## 2019-08-17 RX ORDER — POTASSIUM CHLORIDE 20 MEQ/15ML
60 SOLUTION ORAL
Status: CANCELLED | OUTPATIENT
Start: 2019-08-17

## 2019-08-17 RX ORDER — RAMELTEON 8 MG/1
8 TABLET ORAL NIGHTLY PRN
Status: CANCELLED | OUTPATIENT
Start: 2019-08-17

## 2019-08-17 RX ORDER — PANTOPRAZOLE SODIUM 40 MG/10ML
40 INJECTION, POWDER, LYOPHILIZED, FOR SOLUTION INTRAVENOUS 2 TIMES DAILY
Status: DISCONTINUED | OUTPATIENT
Start: 2019-08-18 | End: 2019-08-23 | Stop reason: HOSPADM

## 2019-08-17 RX ORDER — FOLIC ACID 1 MG/1
1 TABLET ORAL DAILY
Status: CANCELLED | OUTPATIENT
Start: 2019-08-18

## 2019-08-17 RX ORDER — HYDRALAZINE HYDROCHLORIDE 20 MG/ML
5 INJECTION INTRAMUSCULAR; INTRAVENOUS EVERY 4 HOURS PRN
Status: DISCONTINUED | OUTPATIENT
Start: 2019-08-17 | End: 2019-08-17 | Stop reason: HOSPADM

## 2019-08-17 RX ORDER — SODIUM CHLORIDE 0.9 % (FLUSH) 0.9 %
10 SYRINGE (ML) INJECTION
Status: CANCELLED | OUTPATIENT
Start: 2019-08-17

## 2019-08-17 RX ORDER — HYDROCODONE BITARTRATE AND ACETAMINOPHEN 5; 325 MG/1; MG/1
1 TABLET ORAL EVERY 6 HOURS PRN
Status: CANCELLED | OUTPATIENT
Start: 2019-08-17

## 2019-08-17 RX ORDER — POLYETHYLENE GLYCOL 3350 17 G/17G
17 POWDER, FOR SOLUTION ORAL 2 TIMES DAILY PRN
Status: CANCELLED | OUTPATIENT
Start: 2019-08-17

## 2019-08-17 RX ORDER — CITALOPRAM 20 MG/1
20 TABLET, FILM COATED ORAL DAILY
Status: CANCELLED | OUTPATIENT
Start: 2019-08-18

## 2019-08-17 RX ORDER — ATORVASTATIN CALCIUM 40 MG/1
40 TABLET, FILM COATED ORAL DAILY
Status: CANCELLED | OUTPATIENT
Start: 2019-08-18

## 2019-08-17 RX ORDER — FINASTERIDE 5 MG/1
5 TABLET, FILM COATED ORAL DAILY
COMMUNITY

## 2019-08-17 RX ORDER — POTASSIUM CHLORIDE 20 MEQ/15ML
40 SOLUTION ORAL
Status: CANCELLED | OUTPATIENT
Start: 2019-08-17

## 2019-08-17 RX ORDER — PANTOPRAZOLE SODIUM 40 MG/10ML
40 INJECTION, POWDER, LYOPHILIZED, FOR SOLUTION INTRAVENOUS 2 TIMES DAILY
Status: CANCELLED | OUTPATIENT
Start: 2019-08-17

## 2019-08-17 RX ORDER — ONDANSETRON 8 MG/1
8 TABLET, ORALLY DISINTEGRATING ORAL EVERY 8 HOURS PRN
Status: DISCONTINUED | OUTPATIENT
Start: 2019-08-18 | End: 2019-08-23 | Stop reason: HOSPADM

## 2019-08-17 RX ORDER — PANTOPRAZOLE SODIUM 40 MG/10ML
80 INJECTION, POWDER, LYOPHILIZED, FOR SOLUTION INTRAVENOUS
Status: COMPLETED | OUTPATIENT
Start: 2019-08-17 | End: 2019-08-17

## 2019-08-17 RX ORDER — LANOLIN ALCOHOL/MO/W.PET/CERES
800 CREAM (GRAM) TOPICAL
Status: CANCELLED | OUTPATIENT
Start: 2019-08-17

## 2019-08-17 RX ORDER — ONDANSETRON 2 MG/ML
4 INJECTION INTRAMUSCULAR; INTRAVENOUS EVERY 8 HOURS PRN
Status: CANCELLED | OUTPATIENT
Start: 2019-08-17

## 2019-08-17 RX ORDER — ACETAMINOPHEN 325 MG/1
650 TABLET ORAL EVERY 4 HOURS PRN
Status: DISCONTINUED | OUTPATIENT
Start: 2019-08-18 | End: 2019-08-23 | Stop reason: HOSPADM

## 2019-08-17 RX ORDER — SODIUM CHLORIDE 0.9 % (FLUSH) 0.9 %
10 SYRINGE (ML) INJECTION
Status: DISCONTINUED | OUTPATIENT
Start: 2019-08-18 | End: 2019-08-23 | Stop reason: HOSPADM

## 2019-08-17 RX ADMIN — IOHEXOL 100 ML: 350 INJECTION, SOLUTION INTRAVENOUS at 03:08

## 2019-08-17 RX ADMIN — PANTOPRAZOLE SODIUM 80 MG: 40 INJECTION, POWDER, FOR SOLUTION INTRAVENOUS at 02:08

## 2019-08-17 NOTE — ASSESSMENT & PLAN NOTE
Pt states he was taken off of all his antihypertensive meds, and normally runs low  His systolic has been in the 170-180s   IV hydralazine q4h PRN

## 2019-08-17 NOTE — SUBJECTIVE & OBJECTIVE
Past Medical History:   Diagnosis Date    Coronary artery disease     Encounter for blood transfusion     Hypertension        Past Surgical History:   Procedure Laterality Date    CARDIAC SURGERY      CABG X2    HERNIA REPAIR      VASCULAR SURGERY      sents X7       Review of patient's allergies indicates:  No Known Allergies    No current facility-administered medications on file prior to encounter.      Current Outpatient Medications on File Prior to Encounter   Medication Sig    aspirin (ECOTRIN) 81 MG EC tablet Take 81 mg by mouth once daily.    atorvastatin (LIPITOR) 40 MG tablet Take 40 mg by mouth once daily.    citalopram (CELEXA) 40 MG tablet Take 20 mg by mouth once daily.     clopidogrel (PLAVIX) 75 mg tablet Take 75 mg by mouth once daily.    fish oil-omega-3 fatty acids 300-1,000 mg capsule Take 2 g by mouth once daily.    folic acid (FOLVITE) 1 MG tablet Take 1 mg by mouth once daily.    multivitamin with minerals tablet Take 1 tablet by mouth once daily.    omeprazole (PRILOSEC) 40 MG capsule Take 40 mg by mouth once daily.    temazepam (RESTORIL) 7.5 MG Cap Take 15 mg by mouth nightly as needed.    [DISCONTINUED] meloxicam (MOBIC) 7.5 MG tablet Take 7.5 mg by mouth once daily.    flecainide (TAMBOCOR) 50 MG Tab Take 100 mg by mouth every 12 (twelve) hours.    isosorbide mononitrate (IMDUR) 120 MG 24 hr tablet Take 120 mg by mouth once daily.    lisinopril (PRINIVIL,ZESTRIL) 5 MG tablet Take 5 mg by mouth once daily.    tamsulosin (FLOMAX) 0.4 mg Cp24 Take 0.4 mg by mouth once daily.     Family History     Problem Relation (Age of Onset)    Cancer Brother    Heart disease Father, Brother    Hypertension Brother        Tobacco Use    Smoking status: Former Smoker     Packs/day: 0.25     Years: 50.00     Pack years: 12.50    Smokeless tobacco: Former User     Quit date: 7/2/2018    Tobacco comment: cigars   Substance and Sexual Activity    Alcohol use: Not on file    Drug use:  No    Sexual activity: Yes     Partners: Female     Review of Systems   Constitutional: Positive for fatigue. Negative for chills, diaphoresis and fever.   HENT: Negative for congestion, ear pain, sore throat and trouble swallowing.    Eyes: Negative for pain, discharge and visual disturbance.   Respiratory: Negative for cough, chest tightness, shortness of breath and wheezing.    Cardiovascular: Negative for chest pain, palpitations and leg swelling.   Gastrointestinal: Positive for blood in stool. Negative for abdominal distention, abdominal pain, constipation, diarrhea, nausea and vomiting.   Endocrine: Negative for polydipsia, polyphagia and polyuria.   Genitourinary: Negative for dysuria, flank pain, frequency and urgency.   Musculoskeletal: Negative for back pain, joint swelling, neck pain and neck stiffness.   Skin: Negative for rash and wound.   Allergic/Immunologic: Negative for immunocompromised state.   Neurological: Positive for dizziness and light-headedness. Negative for syncope, speech difficulty, weakness, numbness and headaches.   Hematological: Negative for adenopathy.   Psychiatric/Behavioral: Negative for confusion and suicidal ideas. The patient is not nervous/anxious.    All other systems reviewed and are negative.    Objective:     Vital Signs (Most Recent):  Temp: 98.2 °F (36.8 °C) (08/17/19 1420)  Pulse: (!) 54 (08/17/19 1646)  Resp: 20 (08/17/19 1646)  BP: (!) 166/77 (08/17/19 1646)  SpO2: 100 % (08/17/19 1646) Vital Signs (24h Range):  Temp:  [98.1 °F (36.7 °C)-98.2 °F (36.8 °C)] 98.2 °F (36.8 °C)  Pulse:  [50-64] 54  Resp:  [16-20] 20  SpO2:  [99 %-100 %] 100 %  BP: (143-166)/(70-84) 166/77     Weight: 90.7 kg (200 lb)  Body mass index is 31.32 kg/m².    Physical Exam   Constitutional: He is oriented to person, place, and time. He appears well-developed and well-nourished.   HENT:   Head: Normocephalic and atraumatic.   Eyes: Pupils are equal, round, and reactive to light. Conjunctivae  and EOM are normal.   Neck: Normal range of motion. Neck supple.   Cardiovascular: Normal rate, regular rhythm, normal heart sounds and intact distal pulses.   Pulmonary/Chest: Effort normal and breath sounds normal.   Abdominal: Soft. Bowel sounds are normal.   Musculoskeletal: Normal range of motion.   Neurological: He is alert and oriented to person, place, and time.   Skin: Skin is warm and dry. Capillary refill takes less than 2 seconds.   Psychiatric: He has a normal mood and affect. His behavior is normal. Judgment and thought content normal.   Nursing note and vitals reviewed.        CRANIAL NERVES     CN III, IV, VI   Pupils are equal, round, and reactive to light.  Extraocular motions are normal.        Significant Labs:   CBC:   Recent Labs   Lab 08/17/19  1330   WBC 4.44   HGB 10.3*   HCT 30.6*   *     CMP:   Recent Labs   Lab 08/17/19  1330      K 4.4      CO2 26      BUN 11   CREATININE 0.8   CALCIUM 9.0   PROT 5.9*   ALBUMIN 3.3*   BILITOT 0.6   ALKPHOS 41*   AST 22   ALT 21   ANIONGAP 6*   EGFRNONAA >60.0       Significant Imaging: I have reviewed all pertinent imaging results/findings within the past 24 hours.   Ct Abdomen Pelvis With Contrast    Result Date: 8/17/2019  CMS MANDATED QUALITY DATA - CT RADIATION - 436 All CT scans at this facility utilize dose modulation, iterative reconstruction, and/or weight based dosing when appropriate to reduce radiation dose to as low as reasonably achievable. EXAMINATION: CT ABDOMEN PELVIS WITH CONTRAST CLINICAL HISTORY: GI bleeding; TECHNIQUE: CT abdomen and pelvis with 100 mL Omnipaque COMPARISON: 07/08/2019 FINDINGS: CT ABDOMEN: There is moderate coronary artery calcification.  Patient has had a prior CABG. There are calcified granuloma within the left lung base. The liver, spleen, pancreas, gallbladder and adrenal glands are normal. The kidneys enhance symmetrically without hydronephrosis or calculi.  There are stable small  nonobstructing renal stones and bilateral renal cysts.. There has been prior hiatal hernia surgery.  There are no thick-walled or dilated bowel loops.  The appendix is normal.  There is colonic diverticulosis. There is no adenopathy. CT PELVIS: There is sigmoid diverticulosis without diverticulitis.  There are prostatic calcifications.  There is a fat containing left inguinal hernia.  There is no pelvic adenopathy.  There are mild degenerative changes of the spine.     Bilateral nonobstructing renal stones Colonic diverticulosis without diverticulitis Fat containing left inguinal hernia Moderate coronary artery calcification Prior hiatal hernia surgery Electronically signed by: Faith Merritt MD Date:    08/17/2019 Time:    15:51        Nm Myocardial Perfusion Spect Multi Pharmacologic    Result Date: 8/2/2019  EXAMINATION: NM MYOCARDIAL PERFUSION SPECT MULTI PHARM CLINICAL HISTORY: Chest pain, normal ekg;  Chest pain, unspecified TECHNIQUE: SPECT images were acquired after the injection of 10.7 mCi of Tc-99m Myoview at rest and 25.1 mCi during a cardiac stress (0.4 mg Lexiscan).  The clinical stress and ECG portion of the study is to be read separately. COMPARISON: None. FINDINGS: Images demonstrate homogeneous distribution of radiotracer on stress and rest imaging.  There is a moderate size fixed perfusion defect involving the inferior myocardial wall.  No reversible perfusion defect to suggest myocardial ischemia. The gated post-stress images reveal no focal wall motion abnormality, with mild generalized hypokinesis.  Estimated LVEF of 45% %.     No evidence of inducible myocardial ischemia. No focal wall motion abnormality.  Ejection fraction of 45%. Fixed inferior wall defect could likely represents diaphragmatic attenuation artifact, however myocardial scar/hibernating myocardium could appear similar. Electronically signed by: Marvin Lee MD Date:    08/02/2019 Time:    13:56

## 2019-08-17 NOTE — ED NOTES
Noted, pt used the bedside commode while in room noted small amount of bright red blood, pt reports from rectum

## 2019-08-17 NOTE — ED PROVIDER NOTES
Encounter Date: 8/17/2019       History     Chief Complaint   Patient presents with    Rectal Bleeding     lightheaded     This is a pleasant 74-year-old male who presents complaining of rectal bleeding and feeling lightheaded.  Patient states that several days ago he felt dizzy had noticed blood in his stool and went to another emergency room where he was admitted to the hospital.  He states he had an upper GI in a lower GI performed but the source of bleeding was not found.  He presents today with a complaint of feeling lightheaded and noticing bright red blood in his stool for several bowel movements this morning.  He denies fever chills or abdominal pain. He has felt lightheaded but has not passed out.  He denies rectal pain.  He denies chest pain or shortness of breath. He denies any focal weakness numbness tingling or paresthesias.  He denies any other problems or complaints. He denies nausea or vomiting or lack of appetite.  He denies any acid reflux type symptoms. He has had bright red blood and has not noted any black stool.  He is on aspirin Plavix and Mobic.    The history is provided by the patient.     Review of patient's allergies indicates:  No Known Allergies  Past Medical History:   Diagnosis Date    Coronary artery disease     Encounter for blood transfusion     Hypertension      Past Surgical History:   Procedure Laterality Date    CARDIAC SURGERY      CABG X2    HERNIA REPAIR      VASCULAR SURGERY      sents X7     Family History   Problem Relation Age of Onset    Heart disease Father     Heart disease Brother     Cancer Brother     Hypertension Brother      Social History     Tobacco Use    Smoking status: Former Smoker     Packs/day: 0.25     Years: 50.00     Pack years: 12.50    Smokeless tobacco: Former User     Quit date: 7/2/2018    Tobacco comment: cigars   Substance Use Topics    Alcohol use: Not on file    Drug use: No     Review of Systems   Constitutional: Negative.   Negative for activity change, appetite change, chills, diaphoresis, fatigue, fever and unexpected weight change.   HENT: Negative.  Negative for congestion, dental problem, ear pain, nosebleeds, postnasal drip, rhinorrhea, sinus pressure, sinus pain, sore throat, tinnitus, trouble swallowing and voice change.    Eyes: Negative.  Negative for pain and visual disturbance.   Respiratory: Negative.  Negative for cough, chest tightness, shortness of breath and wheezing.    Cardiovascular: Negative.  Negative for chest pain, palpitations and leg swelling.   Gastrointestinal: Positive for blood in stool. Negative for abdominal distention, abdominal pain, anal bleeding, constipation, diarrhea, nausea, rectal pain and vomiting.   Endocrine: Negative.    Genitourinary: Negative.  Negative for difficulty urinating, dysuria, flank pain, frequency, penile pain, scrotal swelling, testicular pain and urgency.   Musculoskeletal: Negative.  Negative for arthralgias, back pain, gait problem, joint swelling, myalgias, neck pain and neck stiffness.   Skin: Negative.  Negative for color change, pallor and rash.   Neurological: Positive for light-headedness. Negative for dizziness, seizures, syncope, facial asymmetry, speech difficulty, weakness, numbness and headaches.   Hematological: Negative.  Does not bruise/bleed easily.   Psychiatric/Behavioral: Negative.  Negative for confusion.   All other systems reviewed and are negative.      Physical Exam     Initial Vitals [08/17/19 1245]   BP Pulse Resp Temp SpO2   (!) 148/84 64 16 98.1 °F (36.7 °C) 99 %      MAP       --         Physical Exam    Nursing note and vitals reviewed.  Constitutional: He appears well-developed and well-nourished. He is active.  Non-toxic appearance. He does not have a sickly appearance. He does not appear ill. No distress.   HENT:   Head: Normocephalic and atraumatic.   Nose: Nose normal.   Mouth/Throat: Oropharynx is clear and moist.   Eyes: Conjunctivae, EOM  and lids are normal. Pupils are equal, round, and reactive to light.   Neck: Normal range of motion and full passive range of motion without pain. Neck supple. No thyromegaly present. No spinous process tenderness and no muscular tenderness present. No tracheal deviation, no edema, no erythema and normal range of motion present. No neck rigidity. No JVD present.   Cardiovascular: Normal rate, regular rhythm, normal heart sounds, intact distal pulses and normal pulses. Exam reveals no gallop and no friction rub.    No murmur heard.  Pulmonary/Chest: Effort normal and breath sounds normal. No stridor. No respiratory distress. He has no wheezes. He has no rhonchi. He has no rales.   Abdominal: Soft. Normal appearance and bowel sounds are normal. He exhibits no distension and no mass. There is no tenderness. There is no rebound and no guarding. No hernia.   Genitourinary: Rectal exam shows no external hemorrhoid, no internal hemorrhoid, no fissure, no mass, no tenderness and anal tone normal.   Genitourinary Comments: Digital rectal exam reveals maroon-colored stool.  No tenderness.  Tone is normal.  Perianal sensation is normal.   Musculoskeletal: Normal range of motion. He exhibits no edema or tenderness.        Cervical back: Normal. He exhibits normal range of motion, no tenderness, no bony tenderness and no swelling.        Thoracic back: He exhibits normal range of motion, no tenderness, no bony tenderness and no swelling.        Lumbar back: Normal. He exhibits normal range of motion, no tenderness, no bony tenderness, no swelling and no edema.   Pulses are 2+ throughout, cap refill is less than 2 sec throughout, no edema noted, extremities are nontender throughout with full range of motion   Neurological: He is alert and oriented to person, place, and time. He has normal strength. No cranial nerve deficit or sensory deficit. Coordination normal.   Skin: Skin is warm and dry. Capillary refill takes less than 2  seconds. No ecchymosis, no petechiae and no rash noted. No cyanosis or erythema. No pallor.   Psychiatric: He has a normal mood and affect. His speech is normal and behavior is normal. Judgment and thought content normal. Cognition and memory are normal.         ED Course   Procedures  Labs Reviewed   CBC W/ AUTO DIFFERENTIAL - Abnormal; Notable for the following components:       Result Value    RBC 3.38 (*)     Hemoglobin 10.3 (*)     Hematocrit 30.6 (*)     Platelets 123 (*)     All other components within normal limits   COMPREHENSIVE METABOLIC PANEL - Abnormal; Notable for the following components:    Total Protein 5.9 (*)     Albumin 3.3 (*)     Alkaline Phosphatase 41 (*)     Anion Gap 6 (*)     All other components within normal limits   OCCULT BLOOD X 1, STOOL - Abnormal; Notable for the following components:    Occult Blood Positive (*)     All other components within normal limits   TSH - Abnormal; Notable for the following components:    TSH <0.010 (*)     All other components within normal limits   T4, FREE - Abnormal; Notable for the following components:    Free T4 2.19 (*)     All other components within normal limits   PROTIME-INR   TROPONIN I   AMYLASE   LIPASE   MAGNESIUM   TSH   URINALYSIS   DRUG SCREEN PANEL, URINE EMERGENCY   CK   CK-MB   LIPASE   AMYLASE   TROPONIN I   MAGNESIUM   CK   TYPE & SCREEN        ECG Results          EKG 12-lead (In process)  Result time 08/17/19 15:15:03    In process by Interface, Lab In [facility] (08/17/19 15:15:03)                 Narrative:    Test Reason : R06.02,    Vent. 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 Rate : 051 BPM     Atrial Rate : 051 BPM     P-R Int : 156 ms          QRS Dur : 088 ms      QT Int : 442 ms       P-R-T Axes : 037 -14 016 degrees     QTc Int : 407 ms    Sinus bradycardia  Inferior infarct (cited on or before 24-OCT-2016)  Abnormal ECG  When compared with ECG of 01-AUG-2019 17:04,  Borderline criteria for Lateral infarct are no longer Present    Referred By: [email or web address]   SELF           Confirmed By:                             Imaging Results          CT Abdomen Pelvis With Contrast (Final result)  Result time 08/17/19 15:51:13    Final result by Faith Merritt MD (08/17/19 15:51:13)                 Impression:      Bilateral nonobstructing renal stones    Colonic diverticulosis without diverticulitis    Fat containing left inguinal hernia    Moderate coronary artery calcification    Prior hiatal hernia surgery      Electronically signed by: Faith Merritt MD  Date:    08/17/2019  Time:    15:51             Narrative:      CMS MANDATED QUALITY DATA - CT RADIATION - 436    All CT scans at this facility utilize dose modulation, iterative reconstruction, and/or weight based dosing when appropriate to reduce radiation dose to as low as reasonably achievable.    EXAMINATION:  CT ABDOMEN PELVIS WITH CONTRAST    CLINICAL HISTORY:  GI bleeding;    TECHNIQUE:  CT abdomen and pelvis with 100 mL Omnipaque    COMPARISON:  07/08/2019    FINDINGS:  CT ABDOMEN:    There is moderate coronary artery calcification.  Patient has had a prior CABG.    There are calcified granuloma within the left lung base.    The liver, spleen, pancreas, gallbladder and adrenal glands are normal.    The kidneys enhance symmetrically without hydronephrosis or calculi.  There are stable small nonobstructing renal stones and bilateral renal cysts..    There has been prior  hiatal hernia surgery.  There are no thick-walled or dilated bowel loops.  The appendix is normal.  There is colonic diverticulosis.    There is no adenopathy.    CT PELVIS:    There is sigmoid diverticulosis without diverticulitis.  There are prostatic calcifications.  There is a fat containing left inguinal hernia.  There is no pelvic adenopathy.  There are mild degenerative changes of the spine.                                 Medical Decision Making:   Clinical Tests:   Lab Tests: Reviewed  Radiological Study: Reviewed  Medical Tests: Reviewed  ED Management:  Patient is currently hemodynamically stable non ill appearing and in no acute distress. Will be admitted for comprehensive care and GI consultation  Other:   I have discussed this case with another health care provider.       <> Summary of the Discussion: Discussed with GI, hospitalist will admit.                      Clinical Impression:       ICD-10-CM ICD-9-CM   1. Gastrointestinal hemorrhage, unspecified gastrointestinal hemorrhage type K92.2 578.9   2. SOB (shortness of breath) R06.02 786.05   3. Acute blood loss anemia D62 285.1                                Joyce Crowder MD  12/13/19 1013

## 2019-08-17 NOTE — ASSESSMENT & PLAN NOTE
Admit to med/surg tele  This is likely d/t concurrent use of mobic, asa, plavix - will hold  Consult Dr. Mattson - confirm if he wants prep - ER MD awaiting call back  Clears for now  IV protonix BID

## 2019-08-17 NOTE — HPI
Mr. Davila presents today with complaints of rectal bleeding. It is moderate. It is associated with weakness and intermittent dizziness. He denies chest pain, SOB, fever, chills, N/V/D, or abd pain. About a month ago he was put on mobic for his left hip pain. He then came to the hospital on 8/1 for chest pain with a negative work up/stress test. He was discharged with his home meds and continued to take asa, plavix, and mobic. He developed maroon stools on Wed and went to [facility], where he was admitted and had a EGD and colonoscopy. He reports they were unable to find the source of bleeding from this, and recommended a pill enteroscopy if symptoms persisted. He was symptom free with a stable H&H and sent home and continued his asa, plavix, and mobic. He developed bright red and maroon, clotted stool today and came to the ED. In the ED, he had and episode of moderate volume bloody stool. He has a history of CAD s/p CABGx2 with last one in 2016 and multiple stents prior to his CABG. He has an inguinal hernia that is due to be repaired on the 30th. He has chronic left hip pain.

## 2019-08-17 NOTE — H&P
[facility]  History & Physical    Patient Name: Jovan Davila  MRN: [id number]  Admission Date: 8/17/2019  Attending Physician: Dr. Escudero  Primary Care Provider: Jewel العلي MD         Patient information was obtained from patient and ER records.     Subjective:     Principal Problem:GI bleeding    Chief Complaint:   Chief Complaint   Patient presents with    Rectal Bleeding     lightheaded        HPI: Mr. Davila presents today with complaints of rectal bleeding. It is moderate. It is associated with weakness and intermittent dizziness. He denies chest pain, SOB, fever, chills, N/V/D, or abd pain. About a month ago he was put on mobic for his left hip pain. He then came to the hospital on 8/1 for chest pain with a negative work up/stress test. He was discharged with his home meds and continued to take asa, plavix, and mobic. He developed maroon stools on Wed and went to [facility], where he was admitted and had a EGD and colonoscopy. He reports they were unable to find the source of bleeding from this, and recommended a pill enteroscopy if symptoms persisted. He was symptom free with a stable H&H and sent home and continued his asa, plavix, and mobic. He developed bright red and maroon, clotted stool today and came to the ED. In the ED, he had and episode of moderate volume bloody stool. He has a history of CAD s/p CABGx2 with last one in 2016 and multiple stents prior to his CABG. He has an inguinal hernia that is due to be repaired on the 30th. He has chronic left hip pain.    Past Medical History:   Diagnosis Date    Coronary artery disease     Encounter for blood transfusion     Hypertension        Past Surgical History:   Procedure Laterality Date    CARDIAC SURGERY      CABG X2    HERNIA REPAIR      VASCULAR SURGERY      sents X7       Review of patient's allergies indicates:  No Known Allergies    No current facility-administered medications on file prior to  encounter.      Current Outpatient Medications on File Prior to Encounter   Medication Sig    aspirin (ECOTRIN) 81 MG EC tablet Take 81 mg by mouth once daily.    atorvastatin (LIPITOR) 40 MG tablet Take 40 mg by mouth once daily.    citalopram (CELEXA) 40 MG tablet Take 20 mg by mouth once daily.     clopidogrel (PLAVIX) 75 mg tablet Take 75 mg by mouth once daily.    fish oil-omega-3 fatty acids 300-1,000 mg capsule Take 2 g by mouth once daily.    folic acid (FOLVITE) 1 MG tablet Take 1 mg by mouth once daily.    multivitamin with minerals tablet Take 1 tablet by mouth once daily.    omeprazole (PRILOSEC) 40 MG capsule Take 40 mg by mouth once daily.    temazepam (RESTORIL) 7.5 MG Cap Take 15 mg by mouth nightly as needed.    [DISCONTINUED] meloxicam (MOBIC) 7.5 MG tablet Take 7.5 mg by mouth once daily.    flecainide (TAMBOCOR) 50 MG Tab Take 100 mg by mouth every 12 (twelve) hours.    isosorbide mononitrate (IMDUR) 120 MG 24 hr tablet Take 120 mg by mouth once daily.    lisinopril (PRINIVIL,ZESTRIL) 5 MG tablet Take 5 mg by mouth once daily.    tamsulosin (FLOMAX) 0.4 mg Cp24 Take 0.4 mg by mouth once daily.     Family History     Problem Relation (Age of Onset)    Cancer Brother    Heart disease Father, Brother    Hypertension Brother        Tobacco Use    Smoking status: Former Smoker     Packs/day: 0.25     Years: 50.00     Pack years: 12.50    Smokeless tobacco: Former User     Quit date: 7/2/2018    Tobacco comment: cigars   Substance and Sexual Activity    Alcohol use: Not on file    Drug use: No    Sexual activity: Yes     Partners: Female     Review of Systems   Constitutional: Positive for fatigue. Negative for chills, diaphoresis and fever.   HENT: Negative for congestion, ear pain, sore throat and trouble swallowing.    Eyes: Negative for pain, discharge and visual disturbance.   Respiratory: Negative for cough, chest tightness, shortness of breath and wheezing.    Cardiovascular:  Negative for chest pain, palpitations and leg swelling.   Gastrointestinal: Positive for blood in stool. Negative for abdominal distention, abdominal pain, constipation, diarrhea, nausea and vomiting.   Endocrine: Negative for polydipsia, polyphagia and polyuria.   Genitourinary: Negative for dysuria, flank pain, frequency and urgency.   Musculoskeletal: Negative for back pain, joint swelling, neck pain and neck stiffness.   Skin: Negative for rash and wound.   Allergic/Immunologic: Negative for immunocompromised state.   Neurological: Positive for dizziness and light-headedness. Negative for syncope, speech difficulty, weakness, numbness and headaches.   Hematological: Negative for adenopathy.   Psychiatric/Behavioral: Negative for confusion and suicidal ideas. The patient is not nervous/anxious.    All other systems reviewed and are negative.    Objective:     Vital Signs (Most Recent):  Temp: 98.2 °F (36.8 °C) (08/17/19 1420)  Pulse: (!) 54 (08/17/19 1646)  Resp: 20 (08/17/19 1646)  BP: (!) 166/77 (08/17/19 1646)  SpO2: 100 % (08/17/19 1646) Vital Signs (24h Range):  Temp:  [98.1 °F (36.7 °C)-98.2 °F (36.8 °C)] 98.2 °F (36.8 °C)  Pulse:  [50-64] 54  Resp:  [16-20] 20  SpO2:  [99 %-100 %] 100 %  BP: (143-166)/(70-84) 166/77     Weight: 90.7 kg (200 lb)  Body mass index is 31.32 kg/m².    Physical Exam   Constitutional: He is oriented to person, place, and time. He appears well-developed and well-nourished.   HENT:   Head: Normocephalic and atraumatic.   Eyes: Pupils are equal, round, and reactive to light. Conjunctivae and EOM are normal.   Neck: Normal range of motion. Neck supple.   Cardiovascular: Normal rate, regular rhythm, normal heart sounds and intact distal pulses.   Pulmonary/Chest: Effort normal and breath sounds normal.   Abdominal: Soft. Bowel sounds are normal.   Musculoskeletal: Normal range of motion.   Neurological: He is alert and oriented to person, place, and time.   Skin: Skin is warm and dry.  Capillary refill takes less than 2 seconds.   Psychiatric: He has a normal mood and affect. His behavior is normal. Judgment and thought content normal.   Nursing note and vitals reviewed.        CRANIAL NERVES     CN III, IV, VI   Pupils are equal, round, and reactive to light.  Extraocular motions are normal.        Significant Labs:   CBC:   Recent Labs   Lab 08/17/19  1330   WBC 4.44   HGB 10.3*   HCT 30.6*   *     CMP:   Recent Labs   Lab 08/17/19  1330      K 4.4      CO2 26      BUN 11   CREATININE 0.8   CALCIUM 9.0   PROT 5.9*   ALBUMIN 3.3*   BILITOT 0.6   ALKPHOS 41*   AST 22   ALT 21   ANIONGAP 6*   EGFRNONAA >60.0       Significant Imaging: I have reviewed all pertinent imaging results/findings within the past 24 hours.   Ct Abdomen Pelvis With Contrast    Result Date: 8/17/2019  CMS MANDATED QUALITY DATA - CT RADIATION - 436 All CT scans at this facility utilize dose modulation, iterative reconstruction, and/or weight based dosing when appropriate to reduce radiation dose to as low as reasonably achievable. EXAMINATION: CT ABDOMEN PELVIS WITH CONTRAST CLINICAL HISTORY: GI bleeding; TECHNIQUE: CT abdomen and pelvis with 100 mL Omnipaque COMPARISON: 07/08/2019 FINDINGS: CT ABDOMEN: There is moderate coronary artery calcification.  Patient has had a prior CABG. There are calcified granuloma within the left lung base. The liver, spleen, pancreas, gallbladder and adrenal glands are normal. The kidneys enhance symmetrically without hydronephrosis or calculi.  There are stable small nonobstructing renal stones and bilateral renal cysts.. There has been prior hiatal hernia surgery.  There are no thick-walled or dilated bowel loops.  The appendix is normal.  There is colonic diverticulosis. There is no adenopathy. CT PELVIS: There is sigmoid diverticulosis without diverticulitis.  There are prostatic calcifications.  There is a fat containing left inguinal hernia.  There is no pelvic  adenopathy.  There are mild degenerative changes of the spine.     Bilateral nonobstructing renal stones Colonic diverticulosis without diverticulitis Fat containing left inguinal hernia Moderate coronary artery calcification Prior hiatal hernia surgery Electronically signed by: Faith Merritt MD Date:    08/17/2019 Time:    15:51        Nm Myocardial Perfusion Spect Multi Pharmacologic    Result Date: 8/2/2019  EXAMINATION: NM MYOCARDIAL PERFUSION SPECT MULTI PHARM CLINICAL HISTORY: Chest pain, normal ekg;  Chest pain, unspecified TECHNIQUE: SPECT images were acquired after the injection of 10.7 mCi of Tc-99m Myoview at rest and 25.1 mCi during a cardiac stress (0.4 mg Lexiscan).  The clinical stress and ECG portion of the study is to be read separately. COMPARISON: None. FINDINGS: Images demonstrate homogeneous distribution of radiotracer on stress and rest imaging.  There is a moderate size fixed perfusion defect involving the inferior myocardial wall.  No reversible perfusion defect to suggest myocardial ischemia. The gated post-stress images reveal no focal wall motion abnormality, with mild generalized hypokinesis.  Estimated LVEF of 45% %.     No evidence of inducible myocardial ischemia. No focal wall motion abnormality.  Ejection fraction of 45%. Fixed inferior wall defect could likely represents diaphragmatic attenuation artifact, however myocardial scar/hibernating myocardium could appear similar. Electronically signed by: Marvin Lee MD Date:    08/02/2019 Time:    13:56      Assessment/Plan:     * GI bleeding  Admit to med/surg tele  This is likely d/t concurrent use of mobic, asa, plavix - will hold  Consult Dr. Mattson - confirm if he wants prep - ER MD awaiting call back  Clears for now  IV protonix BID        Hypertension  Pt states he was taken off of all his antihypertensive meds, and normally runs low  His systolic has been in the 170-180s   IV hydralazine q4h PRN       Acute blood loss  anemia  Mild and improved since 8/15 when it was 9.4/27.2  Will get serial H&Hs       Bradycardia  Stable   Monitor  Not on any beta blockers/antiarrhythmics        VTE Risk Mitigation (From admission, onward)    None             Marie Hill NP  Department of Hospital Medicine   [facility]

## 2019-08-18 LAB
ABO + RH BLD: NORMAL
ALBUMIN SERPL BCP-MCNC: 2.4 G/DL (ref 3.5–5.2)
ALP SERPL-CCNC: 34 U/L (ref 55–135)
ALT SERPL W/O P-5'-P-CCNC: 14 U/L (ref 10–44)
ANION GAP SERPL CALC-SCNC: 6 MMOL/L (ref 8–16)
AST SERPL-CCNC: 16 U/L (ref 10–40)
BASOPHILS # BLD AUTO: 0.02 K/UL (ref 0–0.2)
BASOPHILS # BLD AUTO: 0.03 K/UL (ref 0–0.2)
BASOPHILS # BLD AUTO: 0.03 K/UL (ref 0–0.2)
BASOPHILS NFR BLD: 0.3 % (ref 0–1.9)
BASOPHILS NFR BLD: 0.4 % (ref 0–1.9)
BASOPHILS NFR BLD: 0.4 % (ref 0–1.9)
BILIRUB SERPL-MCNC: 1.4 MG/DL (ref 0.1–1)
BLD GP AB SCN CELLS X3 SERPL QL: NORMAL
BUN SERPL-MCNC: 13 MG/DL (ref 8–23)
CALCIUM SERPL-MCNC: 7.9 MG/DL (ref 8.7–10.5)
CHLORIDE SERPL-SCNC: 110 MMOL/L (ref 95–110)
CO2 SERPL-SCNC: 22 MMOL/L (ref 23–29)
CREAT SERPL-MCNC: 0.8 MG/DL (ref 0.5–1.4)
DIFFERENTIAL METHOD: ABNORMAL
EOSINOPHIL # BLD AUTO: 0.1 K/UL (ref 0–0.5)
EOSINOPHIL NFR BLD: 0.6 % (ref 0–8)
EOSINOPHIL NFR BLD: 0.6 % (ref 0–8)
EOSINOPHIL NFR BLD: 1.4 % (ref 0–8)
ERYTHROCYTE [DISTWIDTH] IN BLOOD BY AUTOMATED COUNT: 14.4 % (ref 11.5–14.5)
ERYTHROCYTE [DISTWIDTH] IN BLOOD BY AUTOMATED COUNT: 14.4 % (ref 11.5–14.5)
ERYTHROCYTE [DISTWIDTH] IN BLOOD BY AUTOMATED COUNT: 14.8 % (ref 11.5–14.5)
EST. GFR  (AFRICAN AMERICAN): >60 ML/MIN/1.73 M^2
EST. GFR  (NON AFRICAN AMERICAN): >60 ML/MIN/1.73 M^2
GLUCOSE SERPL-MCNC: 107 MG/DL (ref 70–110)
HCT VFR BLD AUTO: 25.5 % (ref 40–54)
HCT VFR BLD AUTO: 27.7 % (ref 40–54)
HCT VFR BLD AUTO: 27.7 % (ref 40–54)
HGB BLD-MCNC: 8.4 G/DL (ref 14–18)
HGB BLD-MCNC: 8.7 G/DL (ref 14–18)
HGB BLD-MCNC: 8.7 G/DL (ref 14–18)
HGB BLD-MCNC: 9.4 G/DL (ref 14–18)
HGB BLD-MCNC: 9.4 G/DL (ref 14–18)
IMM GRANULOCYTES # BLD AUTO: 0.01 K/UL (ref 0–0.04)
IMM GRANULOCYTES # BLD AUTO: 0.02 K/UL (ref 0–0.04)
IMM GRANULOCYTES # BLD AUTO: 0.02 K/UL (ref 0–0.04)
IMM GRANULOCYTES NFR BLD AUTO: 0.2 % (ref 0–0.5)
LYMPHOCYTES # BLD AUTO: 1.5 K/UL (ref 1–4.8)
LYMPHOCYTES # BLD AUTO: 2 K/UL (ref 1–4.8)
LYMPHOCYTES # BLD AUTO: 2 K/UL (ref 1–4.8)
LYMPHOCYTES NFR BLD: 23.6 % (ref 18–48)
LYMPHOCYTES NFR BLD: 23.6 % (ref 18–48)
LYMPHOCYTES NFR BLD: 24.2 % (ref 18–48)
MAGNESIUM SERPL-MCNC: 1.7 MG/DL (ref 1.6–2.6)
MCH RBC QN AUTO: 29.1 PG (ref 27–31)
MCH RBC QN AUTO: 29.2 PG (ref 27–31)
MCH RBC QN AUTO: 29.2 PG (ref 27–31)
MCHC RBC AUTO-ENTMCNC: 32.9 G/DL (ref 32–36)
MCHC RBC AUTO-ENTMCNC: 33.9 G/DL (ref 32–36)
MCHC RBC AUTO-ENTMCNC: 33.9 G/DL (ref 32–36)
MCV RBC AUTO: 86 FL (ref 82–98)
MCV RBC AUTO: 86 FL (ref 82–98)
MCV RBC AUTO: 88 FL (ref 82–98)
MONOCYTES # BLD AUTO: 0.6 K/UL (ref 0.3–1)
MONOCYTES # BLD AUTO: 0.7 K/UL (ref 0.3–1)
MONOCYTES # BLD AUTO: 0.7 K/UL (ref 0.3–1)
MONOCYTES NFR BLD: 8.6 % (ref 4–15)
MONOCYTES NFR BLD: 8.6 % (ref 4–15)
MONOCYTES NFR BLD: 9.9 % (ref 4–15)
NEUTROPHILS # BLD AUTO: 4 K/UL (ref 1.8–7.7)
NEUTROPHILS # BLD AUTO: 5.6 K/UL (ref 1.8–7.7)
NEUTROPHILS # BLD AUTO: 5.6 K/UL (ref 1.8–7.7)
NEUTROPHILS NFR BLD: 64 % (ref 38–73)
NEUTROPHILS NFR BLD: 66.6 % (ref 38–73)
NEUTROPHILS NFR BLD: 66.6 % (ref 38–73)
NRBC BLD-RTO: 0 /100 WBC
PHOSPHATE SERPL-MCNC: 4.1 MG/DL (ref 2.7–4.5)
PLATELET # BLD AUTO: 100 K/UL (ref 150–350)
PLATELET # BLD AUTO: 112 K/UL (ref 150–350)
PLATELET # BLD AUTO: 112 K/UL (ref 150–350)
PMV BLD AUTO: 12.1 FL (ref 9.2–12.9)
PMV BLD AUTO: 12.1 FL (ref 9.2–12.9)
PMV BLD AUTO: 12.2 FL (ref 9.2–12.9)
POTASSIUM SERPL-SCNC: 4.2 MMOL/L (ref 3.5–5.1)
PROT SERPL-MCNC: 4.3 G/DL (ref 6–8.4)
RBC # BLD AUTO: 2.89 M/UL (ref 4.6–6.2)
RBC # BLD AUTO: 3.22 M/UL (ref 4.6–6.2)
RBC # BLD AUTO: 3.22 M/UL (ref 4.6–6.2)
SODIUM SERPL-SCNC: 138 MMOL/L (ref 136–145)
WBC # BLD AUTO: 6.24 K/UL (ref 3.9–12.7)
WBC # BLD AUTO: 8.4 K/UL (ref 3.9–12.7)
WBC # BLD AUTO: 8.4 K/UL (ref 3.9–12.7)

## 2019-08-18 PROCEDURE — C9113 INJ PANTOPRAZOLE SODIUM, VIA: HCPCS | Performed by: STUDENT IN AN ORGANIZED HEALTH CARE EDUCATION/TRAINING PROGRAM

## 2019-08-18 PROCEDURE — 80053 COMPREHEN METABOLIC PANEL: CPT

## 2019-08-18 PROCEDURE — 27000221 HC OXYGEN, UP TO 24 HOURS

## 2019-08-18 PROCEDURE — 99223 PR INITIAL HOSPITAL CARE,LEVL III: ICD-10-PCS | Mod: AI,GC,, | Performed by: INTERNAL MEDICINE

## 2019-08-18 PROCEDURE — 99223 1ST HOSP IP/OBS HIGH 75: CPT | Mod: AI,GC,, | Performed by: INTERNAL MEDICINE

## 2019-08-18 PROCEDURE — 99223 PR INITIAL HOSPITAL CARE,LEVL III: ICD-10-PCS | Mod: GC,,, | Performed by: INTERNAL MEDICINE

## 2019-08-18 PROCEDURE — 84100 ASSAY OF PHOSPHORUS: CPT

## 2019-08-18 PROCEDURE — 99223 1ST HOSP IP/OBS HIGH 75: CPT | Mod: GC,,, | Performed by: INTERNAL MEDICINE

## 2019-08-18 PROCEDURE — 20000000 HC ICU ROOM

## 2019-08-18 PROCEDURE — 85018 HEMOGLOBIN: CPT

## 2019-08-18 PROCEDURE — 85025 COMPLETE CBC W/AUTO DIFF WBC: CPT | Mod: 91

## 2019-08-18 PROCEDURE — 63600175 PHARM REV CODE 636 W HCPCS: Performed by: STUDENT IN AN ORGANIZED HEALTH CARE EDUCATION/TRAINING PROGRAM

## 2019-08-18 PROCEDURE — 83735 ASSAY OF MAGNESIUM: CPT

## 2019-08-18 RX ADMIN — PANTOPRAZOLE SODIUM 80 MG: 40 INJECTION, POWDER, FOR SOLUTION INTRAVENOUS at 12:08

## 2019-08-18 RX ADMIN — PANTOPRAZOLE SODIUM 40 MG: 40 INJECTION, POWDER, LYOPHILIZED, FOR SOLUTION INTRAVENOUS at 08:08

## 2019-08-18 RX ADMIN — PANTOPRAZOLE SODIUM 40 MG: 40 INJECTION, POWDER, LYOPHILIZED, FOR SOLUTION INTRAVENOUS at 09:08

## 2019-08-18 NOTE — PLAN OF CARE
A: Awake    RASS: Goal - 0  alert and calm Actual - 0  alert and calm   Restraint necessity: no  B: Breath   SBT: NA  C: Coordinate A & B, analgesics/sedatives   Pain: managed   SAT: NA  D: Delirium   CAM-ICU: negative  E: Early Mobility   MOVE Screen: Pass   Activity order: none   FAS: Feeding/Nutrition   Diet order: NPO Fluid restriction: None  T: Thrombus   DVT prophylaxis: contraindicated  H: HOB Elevation   30 degrees: No  U: Ulcer Prophylaxis   GI: yes  G: Glucose control   managed  S: Skin   Bundle compliance: yes  B: Bowel Function   diarrhea  I: Indwelling Catheters   Delgado necessity: No   CVC necessity: No   IPAD offered: Yes  D: De-escalation Antibx   No  Plan for the day    - monitor GIB and check CBC Q 6 hrs   - GI consult in place for this AM  Family/Goals of care/Code Status   Code Status: Full Code   GOC: Correct GIB and d/c home

## 2019-08-18 NOTE — SUBJECTIVE & OBJECTIVE
Past Medical History:   Diagnosis Date    Coronary artery disease     Diverticulosis     Encounter for blood transfusion     Hypertension        Past Surgical History:   Procedure Laterality Date    CARDIAC SURGERY      CABG X2    HERNIA REPAIR      VASCULAR SURGERY      sents X7       Review of patient's allergies indicates:  No Known Allergies    Family History     Problem Relation (Age of Onset)    Cancer Brother    Heart disease Father, Brother    Hypertension Brother        Tobacco Use    Smoking status: Former Smoker     Packs/day: 0.25     Years: 50.00     Pack years: 12.50    Smokeless tobacco: Former User     Quit date: 7/2/2018    Tobacco comment: cigars   Substance and Sexual Activity    Alcohol use: Not Currently    Drug use: No    Sexual activity: Yes     Partners: Female      Review of Systems   Constitutional: Positive for fatigue. Negative for chills, diaphoresis and fever.   HENT: Negative for congestion, ear pain, sore throat and trouble swallowing.    Eyes: Negative for pain, discharge and visual disturbance.   Respiratory: Negative for cough, chest tightness, shortness of breath and wheezing.    Cardiovascular: Negative for chest pain, palpitations and leg swelling.   Gastrointestinal: Positive for blood in stool. Negative for abdominal distention, abdominal pain, constipation, diarrhea, nausea and vomiting.   Endocrine: Negative for polydipsia, polyphagia and polyuria.   Genitourinary: Negative for dysuria, flank pain, frequency and urgency.   Musculoskeletal: Negative for back pain, joint swelling, neck pain and neck stiffness.   Skin: Negative for rash and wound.   Allergic/Immunologic: Negative for immunocompromised state.   Neurological: Positive for dizziness and light-headedness. Negative for syncope, speech difficulty, weakness, numbness and headaches.   Hematological: Negative for adenopathy.   Psychiatric/Behavioral: Negative for confusion and suicidal ideas. The patient is  not nervous/anxious.    All other systems reviewed and are negative.    Objective:     Vital Signs (Most Recent):  Temp: 98 °F (36.7 °C) (08/17/19 2244)  Pulse: 70 (08/17/19 2244)  Resp: 19 (08/17/19 2244)  BP: 117/68 (08/17/19 2244)  SpO2: 100 % (08/17/19 2244) Vital Signs (24h Range):  Temp:  [98 °F (36.7 °C)-98.2 °F (36.8 °C)] 98 °F (36.7 °C)  Pulse:  [] 70  Resp:  [15-22] 19  SpO2:  [99 %-100 %] 100 %  BP: ()/(47-84) 117/68   Weight: 91.3 kg (201 lb 4.5 oz)  Body mass index is 31.52 kg/m².    No intake or output data in the 24 hours ending 08/18/19 0019    Physical Exam   Constitutional: He is oriented to person, place, and time. He appears well-developed and well-nourished.   HENT:   Head: Normocephalic and atraumatic.   Eyes: Pupils are equal, round, and reactive to light. Conjunctivae and EOM are normal.   Neck: Normal range of motion. Neck supple.   Cardiovascular: Normal rate, regular rhythm, normal heart sounds and intact distal pulses.   Pulmonary/Chest: Effort normal and breath sounds normal.   Abdominal: Soft. Bowel sounds are normal.   Diaper with evidence of bloody stool    Musculoskeletal: Normal range of motion.   Neurological: He is alert and oriented to person, place, and time.   Skin: Skin is warm and dry. Capillary refill takes less than 2 seconds.   Psychiatric: He has a normal mood and affect. His behavior is normal. Judgment and thought content normal.   Nursing note and vitals reviewed.      Vents:     Lines/Drains/Airways     Peripheral Intravenous Line                 Peripheral IV - Single Lumen 08/17/19 1320 18 G Right Antecubital less than 1 day         Peripheral IV - Single Lumen 08/17/19 1325 20 G Right Hand less than 1 day              Significant Labs:    CBC/Anemia Profile:  Recent Labs   Lab 08/17/19  1330 08/17/19  1422 08/17/19  1908 08/17/19  2302   WBC 4.44  --   --  12.38  12.38   HGB 10.3*  --   --  10.6*  10.6*   HCT 30.6*  --  19* 32.3*  32.3*   *   --   --  112*  112*   MCV 91  --   --  90  90   RDW 12.1  --   --  13.6  13.6   OCCULTBLOOD  --  Positive*  --   --         Chemistries:  Recent Labs   Lab 08/17/19  1330 08/17/19  2302    142   K 4.4 4.3    113*   CO2 26 22*   BUN 11 12   CREATININE 0.8 0.8   CALCIUM 9.0 8.1*   ALBUMIN 3.3* 2.4*   PROT 5.9* 4.5*   BILITOT 0.6 1.8*   ALKPHOS 41* 41*   ALT 21 16   AST 22 17   MG 1.7 1.6   PHOS  --  2.8       All pertinent labs within the past 24 hours have been reviewed.    Significant Imaging: I have reviewed all pertinent imaging results/findings within the past 24 hours.  I have reviewed and interpreted all pertinent imaging results/findings within the past 24 hours.

## 2019-08-18 NOTE — ED NOTES
Report called to Jeannine at [facility], transport in progress with Mateo, patient is stable and VWNL.

## 2019-08-18 NOTE — HPI
This is a 75 yo M with hx of CAD and CABG (on ASA and plavix) and chronic hip pain who was transferred from [facility] for GI bleed. Patient reports that four days ago he developed bright red blood per rectum. He had about two episodes before feeling weak and lighteaded. He presented to a hospital in [place] and reports he had an EGD and colonoscopy done the following day by Dr. Henao. He reports both procedures were normal, except for internal hemorrhoids, and no blood was seen or source of blood loss. It was recommended he follow up with outpatient video capsule. Two days later while at home, patient again developed blood in his stools, associated with lightheadedness. He eventually presented to [facility] and continued to have bloody bowel movements. Reported getting between 2-4 UPRBC while there. He was transferred to [facility] for video capsule vs IR embolization. He did have a CT yesterday which did not show any active bleeding but this was not a CTA.    Patient is currently hemodynamically stable. He is in the ICU given he required multiple UPRBCs. His last bowel movement was this morning which was dark red bloody with some blood clots. He denies any abdominal pain, nausea, vomiting, hematemesis. He has been taking Mobic daily for the past month or so. He last took Plavix two days ago.

## 2019-08-18 NOTE — NURSING
Patient arrived to [facility] via EMS. Transferred to ICU bed, connected to CM, 2L O2 NC, NIBP wnl. AAO x 4, denies pain at this time. Mini care provided and preventative dressings placed to sacrum and bilateral heels. Bed locked and low, call bell in reach. Patient reports that wife and children are aware that patient was being transported here from [place].

## 2019-08-18 NOTE — PLAN OF CARE
Problem: Adult Inpatient Plan of Care  Goal: Plan of Care Review  Outcome: Ongoing (interventions implemented as appropriate)  Patient SB/NSR on continuous telemetry, O2 Sat 100% on 2L NC, and afebrile. See flowsheets for details on VS and assessments, no hypotension.  No falls, did not report dizziness or try to get OOB, using bedpan/urinal.  No complaints of pain, nausea, or vomiting. See MAR for meds given this shift. NPO since midnight. Bloody BM x 2 since arrival. H/H stable on AM labs, CBC q 6hr.    **This POC reviewed with patient, Eliud Davila, who verbalized understanding                Problem: Bleeding (Gastrointestinal Bleeding)  Goal: Hemostasis    Intervention: Manage Gastrointestinal Bleeding  Received PRBC x 4 before arrival with IVF bolus given in route by EMS, no concerns since arrival to [facility]

## 2019-08-18 NOTE — PLAN OF CARE
Problem: Adult Inpatient Plan of Care  Goal: Patient-Specific Goal (Individualization)  Outcome: Ongoing (interventions implemented as appropriate)  A: Awake    RASS: Goal - 0  alert and calm Actual - 0  alert and calm   Restraint necessity: no  B: Breath   SBT: Not intubated  C: Coordinate A & B, analgesics/sedatives   Pain: managed   SAT: Not intubated  D: Delirium   CAM-ICU: negative  E: Early Mobility   MOVE Screen: Pass   Activity order: Adjusted per tolerance  FAS: Feeding/Nutrition   Diet order: Full liquid Fluid restriction: None  T: Thrombus   DVT prophylaxis: Contraindicated  H: HOB Elevation   30 degrees: Yes   U: Ulcer Prophylaxis   GI: yes  G: Glucose control   managed  S: Skin   Bundle compliance: yes  B: Bowel Function   Melena  I: Indwelling Catheters   Delgado necessity: No   CVC necessity: No   IPAD offered: Yes  D: De-escalation Antibx   No  Plan for the day   Speak with GI team of options, monitor hgb  Family/Goals of care/Code Status   Code Status: Full Code   GOC: Antegrade double balloon Scope tomorrow to correct GIB

## 2019-08-18 NOTE — HOSPITAL COURSE
Pt never made it to the floor and remains in the ED. Alerted by nurse that patient became dizzy when standing and had 3 episodes of hematochezia. He became hypotensive 90/52 which improved with lying flat and IV fluid bolus. On exam, he is pale, diaphoretic, with blood stained undergarments and bed. I spoke with Dr. Mattson who recommends a stat bleeding scan or IR angiography and transfer to a higher level of care to [facility]. I ordered 2 units PRBCs to be given stat and this was started by the ER nurse. After receiving 1 unit of blood he had another episode of hematochezia and became hypotensive 80/50. The second unit of blood was started and patient's BP is 105/59 HR 80. He remains pale and diaphoretic. EMS arrived to ED to transfer patient.    Discharge Exam:  General: pt pale, diaphoretic  GI: Bright red and maroon stool in brief    Critical care time: 65 min

## 2019-08-18 NOTE — H&P
[facility]  Critical Care Medicine  History & Physical    Patient Name: Jovan Davila  MRN: [id number]  Admission Date: 8/17/2019  Hospital Length of Stay: 1 days  Code Status: Full Code  Attending Physician: Bryon Michael MD   Primary Care Provider: Jewel العلي MD   Principal Problem: GI bleed    Subjective:     HPI:  Mr. Davila is a 74 y.o. who is a transfer from [facility] for GIB. Patien has a PMH of CAD s/p CABG ( on ASA, plavix), chronic hip pain ( started mobic three weeks ago ) presented two days ago to [facility] for syncope and bleeding , he had BRBPR and underwent a colonoscopy that revealed bleeding internal hemorrhoids. Was told that he likely needed a capsule enteroscopy was discharged with follow up ( hgb at the time 9) . Today he presented to [place] for recurrent rectal bleed and intermittent dizziness.        He was found to be  hypotensive 90/52 which improved with lying flat and IV fluid bolus. CT abdomen pelvis with contrast with no source of bleed but shows diverticulosis. Was given 4 units of blood and 2oocc of fluid before transfer     Hgb on arrival was 10, last BM was before arrival     Hospital/ICU Course:  Admitted to the ICU for active GI bleed, on arrival patient HDS with MAP > 70 Hgb 10 .      Past Medical History:   Diagnosis Date    Coronary artery disease     Diverticulosis     Encounter for blood transfusion     Hypertension        Past Surgical History:   Procedure Laterality Date    CARDIAC SURGERY      CABG X2    HERNIA REPAIR      VASCULAR SURGERY      sents X7       Review of patient's allergies indicates:  No Known Allergies    Family History     Problem Relation (Age of Onset)    Cancer Brother    Heart disease Father, Brother    Hypertension Brother        Tobacco Use    Smoking status: Former Smoker     Packs/day: 0.25     Years: 50.00     Pack years: 12.50    Smokeless tobacco: Former User     Quit date: 7/2/2018    Tobacco  comment: cigars   Substance and Sexual Activity    Alcohol use: Not Currently    Drug use: No    Sexual activity: Yes     Partners: Female      Review of Systems   Constitutional: Positive for fatigue. Negative for chills, diaphoresis and fever.   HENT: Negative for congestion, ear pain, sore throat and trouble swallowing.    Eyes: Negative for pain, discharge and visual disturbance.   Respiratory: Negative for cough, chest tightness, shortness of breath and wheezing.    Cardiovascular: Negative for chest pain, palpitations and leg swelling.   Gastrointestinal: Positive for blood in stool. Negative for abdominal distention, abdominal pain, constipation, diarrhea, nausea and vomiting.   Endocrine: Negative for polydipsia, polyphagia and polyuria.   Genitourinary: Negative for dysuria, flank pain, frequency and urgency.   Musculoskeletal: Negative for back pain, joint swelling, neck pain and neck stiffness.   Skin: Negative for rash and wound.   Allergic/Immunologic: Negative for immunocompromised state.   Neurological: Positive for dizziness and light-headedness. Negative for syncope, speech difficulty, weakness, numbness and headaches.   Hematological: Negative for adenopathy.   Psychiatric/Behavioral: Negative for confusion and suicidal ideas. The patient is not nervous/anxious.    All other systems reviewed and are negative.    Objective:     Vital Signs (Most Recent):  Temp: 98 °F (36.7 °C) (08/17/19 2244)  Pulse: 70 (08/17/19 2244)  Resp: 19 (08/17/19 2244)  BP: 117/68 (08/17/19 2244)  SpO2: 100 % (08/17/19 2244) Vital Signs (24h Range):  Temp:  [98 °F (36.7 °C)-98.2 °F (36.8 °C)] 98 °F (36.7 °C)  Pulse:  [] 70  Resp:  [15-22] 19  SpO2:  [99 %-100 %] 100 %  BP: ()/(47-84) 117/68   Weight: 91.3 kg (201 lb 4.5 oz)  Body mass index is 31.52 kg/m².    No intake or output data in the 24 hours ending 08/18/19 0019    Physical Exam   Constitutional: He is oriented to person, place, and time. He appears  well-developed and well-nourished.   HENT:   Head: Normocephalic and atraumatic.   Eyes: Pupils are equal, round, and reactive to light. Conjunctivae and EOM are normal.   Neck: Normal range of motion. Neck supple.   Cardiovascular: Normal rate, regular rhythm, normal heart sounds and intact distal pulses.   Pulmonary/Chest: Effort normal and breath sounds normal.   Abdominal: Soft. Bowel sounds are normal.   Diaper with evidence of bloody stool    Musculoskeletal: Normal range of motion.   Neurological: He is alert and oriented to person, place, and time.   Skin: Skin is warm and dry. Capillary refill takes less than 2 seconds.   Psychiatric: He has a normal mood and affect. His behavior is normal. Judgment and thought content normal.   Nursing note and vitals reviewed.      Vents:     Lines/Drains/Airways     Peripheral Intravenous Line                 Peripheral IV - Single Lumen 08/17/19 1320 18 G Right Antecubital less than 1 day         Peripheral IV - Single Lumen 08/17/19 1325 20 G Right Hand less than 1 day              Significant Labs:    CBC/Anemia Profile:  Recent Labs   Lab 08/17/19  1330 08/17/19  1422 08/17/19  1908 08/17/19  2302   WBC 4.44  --   --  12.38  12.38   HGB 10.3*  --   --  10.6*  10.6*   HCT 30.6*  --  19* 32.3*  32.3*   *  --   --  112*  112*   MCV 91  --   --  90  90   RDW 12.1  --   --  13.6  13.6   OCCULTBLOOD  --  Positive*  --   --         Chemistries:  Recent Labs   Lab 08/17/19  1330 08/17/19  2302    142   K 4.4 4.3    113*   CO2 26 22*   BUN 11 12   CREATININE 0.8 0.8   CALCIUM 9.0 8.1*   ALBUMIN 3.3* 2.4*   PROT 5.9* 4.5*   BILITOT 0.6 1.8*   ALKPHOS 41* 41*   ALT 21 16   AST 22 17   MG 1.7 1.6   PHOS  --  2.8       All pertinent labs within the past 24 hours have been reviewed.    Significant Imaging: I have reviewed all pertinent imaging results/findings within the past 24 hours.  I have reviewed and interpreted all pertinent imaging results/findings  within the past 24 hours.    Assessment/Plan:     Cardiac/Vascular  Hypertension  - hold all BP meds    Coronary artery disease  - Hold ASA, plavix given recent GI bleed  - Patient with No CP    GI  * GI bleed  Jovan Davila is a 74 y.o. who is on ASA and plavix and recently three weeks ago started daily mobic who presented to the OSH for dizziness and BRBPR. Patient with recent EGD ( although can not find the notes), colonoscopy ( with internal hemorrhoids) who no active source of bleed, transferred for capsule enteroscopy vs IR embolization. CT abdomen in OSH with diverticulosis     Plan :   - Protonix 80mg IV bolus x 40 mg BID   - Intravascular resuscitation/support with IVFs and pRBCs received 4 units before transfer.  - Serial H/H's q6  -Hold plavix and ASA for now and instructed patient to avoid NSAIDS  - has 2 18 gauges   -NPO   - GI consult in the morning           Critical Care Daily Checklist:    A: Awake: RASS Goal/Actual Goal:    Actual:     B: Spontaneous Breathing Trial Performed?     C: SAT & SBT Coordinated?  NA                      D: Delirium: CAM-ICU     E: Early Mobility Performed? Yes   F: Feeding Goal:    Status:     Current Diet Order   Procedures    Diet NPO      AS: Analgesia/Sedation NA   T: Thromboembolic Prophylaxis -   H: HOB > 300 Yes   U: Stress Ulcer Prophylaxis (if needed) PPI   G: Glucose Control -   B: Bowel Function Stool Occurrence: 1   I: Indwelling Catheter (Lines & Delgado) Necessity 2 PIVs   D: De-escalation of Antimicrobials/Pharmacotherapies -    Plan for the day/ETD Consult GI     Code Status:  Family/Goals of Care: Full Code         Critical secondary to Patient has a condition that poses threat to life and bodily function: Acute GI bleed     Critical care was time spent personally by me on the following activities: development of treatment plan with patient or surrogate and bedside caregivers, discussions with consultants, evaluation of patient's response to treatment,  examination of patient, ordering and performing treatments and interventions, ordering and review of laboratory studies, ordering and review of radiographic studies, pulse oximetry, re-evaluation of patient's condition. This critical care time did not overlap with that of any other provider or involve time for any procedures.    Patient seen and examined this morning. Discussed with Dr. Michael  - staff attestation to follow.         Danielle Kearns MD  Critical Care Medicine  [facility]

## 2019-08-18 NOTE — H&P (VIEW-ONLY)
[facility]  Gastroenterology  Consult Note    Patient Name: Jovan Davila  MRN: [id number]  Admission Date: 8/17/2019  Hospital Length of Stay: 1 days  Code Status: Full Code   Attending Provider: Master Fajardo MD   Consulting Provider: Indra Nicolas MD  Primary Care Physician: Jewel العلي MD  Principal Problem:GI bleed    Inpatient consult to Gastroenterology  Consult performed by: Indra Nicolas MD  Consult ordered by: Danielle Kearns MD        Subjective:     HPI:  This is a 75 yo M with hx of CAD and CABG (on ASA and plavix) and chronic hip pain who was transferred from [facility] for GI bleed. Patient reports that four days ago he developed bright red blood per rectum. He had about two episodes before feeling weak and lighteaded. He presented to a hospital in [place] and reports he had an EGD and colonoscopy done the following day by Dr. Henao. He reports both procedures were normal, except for internal hemorrhoids, and no blood was seen or source of blood loss. It was recommended he follow up with outpatient video capsule. Two days later while at home, patient again developed blood in his stools, associated with lightheadedness. He eventually presented to [place] and continued to have bloody bowel movements. Reported getting between 2-4 UPRBC while there. He was transferred to [facility] for video capsule vs IR embolization. He did have a CT yesterday which did not show any active bleeding but this was not a CTA.    Patient is currently hemodynamically stable. He is in the ICU given he required multiple UPRBCs. His last bowel movement was this morning which was dark red bloody with some blood clots. He denies any abdominal pain, nausea, vomiting, hematemesis. He has been taking Mobic daily for the past month or so. He last took Plavix two days ago.    Past Medical History:   Diagnosis Date    Coronary artery disease     Diverticulosis     Encounter for blood transfusion      Hypertension        Past Surgical History:   Procedure Laterality Date    CARDIAC SURGERY      CABG X2    HERNIA REPAIR      VASCULAR SURGERY      sents X7       Review of patient's allergies indicates:  No Known Allergies  Family History     Problem Relation (Age of Onset)    Cancer Brother    Heart disease Father, Brother    Hypertension Brother        Tobacco Use    Smoking status: Former Smoker     Packs/day: 0.25     Years: 50.00     Pack years: 12.50    Smokeless tobacco: Former User     Quit date: 7/2/2018    Tobacco comment: cigars   Substance and Sexual Activity    Alcohol use: Not Currently    Drug use: No    Sexual activity: Yes     Partners: Female     Review of Systems   Constitutional: Positive for activity change and appetite change. Negative for chills and fever.   HENT: Negative for sore throat and trouble swallowing.    Respiratory: Negative for cough and shortness of breath.    Cardiovascular: Negative for chest pain and leg swelling.   Gastrointestinal: Positive for blood in stool. Negative for abdominal distention, constipation, diarrhea, nausea and vomiting.   Genitourinary: Negative for decreased urine volume and difficulty urinating.   Musculoskeletal: Negative for arthralgias and back pain.   Skin: Negative for color change and pallor.   Neurological: Positive for weakness. Negative for dizziness and light-headedness.   Psychiatric/Behavioral: Negative for agitation and confusion.     Objective:     Vital Signs (Most Recent):  Temp: 97.9 °F (36.6 °C) (08/18/19 1100)  Pulse: (!) 58 (08/18/19 1200)  Resp: 16 (08/18/19 1200)  BP: (!) 158/62 (08/18/19 1200)  SpO2: 100 % (08/18/19 1200) Vital Signs (24h Range):  Temp:  [97.7 °F (36.5 °C)-98.2 °F (36.8 °C)] 97.9 °F (36.6 °C)  Pulse:  [] 58  Resp:  [12-35] 16  SpO2:  [95 %-100 %] 100 %  BP: ()/(47-84) 158/62     Weight: 91.3 kg (201 lb 4.5 oz) (08/17/19 2245)  Body mass index is 31.52 kg/m².      Intake/Output Summary (Last 24  hours) at 8/18/2019 1222  Last data filed at 8/18/2019 1100  Gross per 24 hour   Intake 370 ml   Output 600 ml   Net -230 ml       Lines/Drains/Airways     Peripheral Intravenous Line                 Peripheral IV - Single Lumen 08/17/19 1320 18 G Right Antecubital less than 1 day         Peripheral IV - Single Lumen 08/17/19 1325 20 G Right Hand less than 1 day                Physical Exam   Constitutional: He is oriented to person, place, and time. He appears well-developed and well-nourished.   HENT:   Mouth/Throat: Oropharynx is clear and moist.   Eyes: No scleral icterus.   Cardiovascular: Normal rate and regular rhythm.   Pulmonary/Chest: Effort normal and breath sounds normal.   Abdominal: Soft. He exhibits no distension and no mass. There is no tenderness. There is no guarding.   Musculoskeletal: He exhibits no edema or deformity.   Neurological: He is alert and oriented to person, place, and time.   Skin: Skin is warm and dry.   Psychiatric: He has a normal mood and affect. His behavior is normal.   Vitals reviewed.      Significant Labs:  CBC:   Recent Labs   Lab 08/17/19  2302 08/18/19  0446 08/18/19  1114   WBC 12.38  12.38 8.40  8.40 6.24   HGB 10.6*  10.6* 9.4*  9.4* 8.4*   HCT 32.3*  32.3* 27.7*  27.7* 25.5*   *  112* 112*  112* 100*     CMP:   Recent Labs   Lab 08/18/19  0446      CALCIUM 7.9*   ALBUMIN 2.4*   PROT 4.3*      K 4.2   CO2 22*      BUN 13   CREATININE 0.8   ALKPHOS 34*   ALT 14   AST 16   BILITOT 1.4*     Coagulation:   Recent Labs   Lab 08/17/19  2302   INR 1.1         Assessment/Plan:     * GI bleed  73 yo M with hx of CAD and CABG (on ASA and plavix) and chronic hip pain who was transferred from [facility] for GI bleed. He reportedly had an EGD and colonoscopy three days ago that were normal, except for internal hemorrhoids. We do not have the reports here or access to the reports. His gastroenterologist suggested he may be having a small bowel  bleed. We will further evaluate with antegrade double balloon enteroscopy tomorrow. He may have a small bowel ulcer from the NSAID use with bleeding exacerbated by aspirin and plavix.     Recommendations:  - Full liquid diet today  - NPO after 10pm  - Plan for antegrade double balloon tomorrow  - Continue PPI  - Keep Hg>7        Thank you for your consult. I will follow-up with patient. Please contact us if you have any additional questions.    Indra Nicolas MD PGY-VI  Gastroenterology Fellow  [facility]  P [phone]

## 2019-08-18 NOTE — HOSPITAL COURSE
Admitted to the ICU for active GI bleed, on arrival patient HDS with MAP > 70 Hgb 10 . GI consulted with plans for antegrade double balloon enteroscopy 8/19. One blood bowel movement since admission. No transfusions here. Patient stable for step down to hospital medicine.

## 2019-08-18 NOTE — HPI
Mr. Davila is a 74 y.o. who is a transfer from [facility] for [facility]. Pativictorino has a PMH of CAD s/p CABG ( on ASA, plavix), chronic hip pain ( started mobic three weeks ago ) presented two days ago to [facility] for syncope and bleeding , he had BRBPR and underwent a colonoscopy that revealed bleeding internal hemorrhoids. Was told that he likely needed a capsule enteroscopy was discharged with follow up ( hgb at the time 9) . Today he presented to [place] for recurrent rectal bleed and intermittent dizziness.        He was found to be  hypotensive 90/52 which improved with lying flat and IV fluid bolus. CT abdomen pelvis with contrast with no source of bleed but shows diverticulosis. Was given 4 units of blood and 2oocc of fluid before transfer     Hgb on arrival was 10, last BM was before arrival

## 2019-08-18 NOTE — ASSESSMENT & PLAN NOTE
73 yo M with hx of CAD and CABG (on ASA and plavix) and chronic hip pain who was transferred from [facility] for GI bleed. He reportedly had an EGD and colonoscopy three days ago that were normal, except for internal hemorrhoids. We do not have the reports here or access to the reports. His gastroenterologist suggested he may be having a small bowel bleed. We will further evaluate with antegrade double balloon enteroscopy tomorrow. He may have a small bowel ulcer from the NSAID use with bleeding exacerbated by aspirin and plavix.     Recommendations:  - Full liquid diet today  - NPO after 10pm  - Plan for antegrade double balloon tomorrow  - Continue PPI  - Keep Hg>7

## 2019-08-18 NOTE — CONSULTS
[facility]  Gastroenterology  Consult Note    Patient Name: Jovan Davila  MRN: [id number]  Admission Date: 8/17/2019  Hospital Length of Stay: 1 days  Code Status: Full Code   Attending Provider: Master Fajardo MD   Consulting Provider: Indra Nicolas MD  Primary Care Physician: Jewel العلي MD  Principal Problem:GI bleed    Inpatient consult to Gastroenterology  Consult performed by: Indra Nicolas MD  Consult ordered by: Danielle Kearns MD        Subjective:     HPI:  This is a 75 yo M with hx of CAD and CABG (on ASA and plavix) and chronic hip pain who was transferred from [facility] for GI bleed. Patient reports that four days ago he developed bright red blood per rectum. He had about two episodes before feeling weak and lighteaded. He presented to a hospital in [place] and reports he had an EGD and colonoscopy done the following day by Dr. Henao. He reports both procedures were normal, except for internal hemorrhoids, and no blood was seen or source of blood loss. It was recommended he follow up with outpatient video capsule. Two days later while at home, patient again developed blood in his stools, associated with lightheadedness. He eventually presented to [place] and continued to have bloody bowel movements. Reported getting between 2-4 UPRBC while there. He was transferred to [facility] for video capsule vs IR embolization. He did have a CT yesterday which did not show any active bleeding but this was not a CTA.    Patient is currently hemodynamically stable. He is in the ICU given he required multiple UPRBCs. His last bowel movement was this morning which was dark red bloody with some blood clots. He denies any abdominal pain, nausea, vomiting, hematemesis. He has been taking Mobic daily for the past month or so. He last took Plavix two days ago.    Past Medical History:   Diagnosis Date    Coronary artery disease     Diverticulosis     Encounter for blood transfusion      Hypertension        Past Surgical History:   Procedure Laterality Date    CARDIAC SURGERY      CABG X2    HERNIA REPAIR      VASCULAR SURGERY      sents X7       Review of patient's allergies indicates:  No Known Allergies  Family History     Problem Relation (Age of Onset)    Cancer Brother    Heart disease Father, Brother    Hypertension Brother        Tobacco Use    Smoking status: Former Smoker     Packs/day: 0.25     Years: 50.00     Pack years: 12.50    Smokeless tobacco: Former User     Quit date: 7/2/2018    Tobacco comment: cigars   Substance and Sexual Activity    Alcohol use: Not Currently    Drug use: No    Sexual activity: Yes     Partners: Female     Review of Systems   Constitutional: Positive for activity change and appetite change. Negative for chills and fever.   HENT: Negative for sore throat and trouble swallowing.    Respiratory: Negative for cough and shortness of breath.    Cardiovascular: Negative for chest pain and leg swelling.   Gastrointestinal: Positive for blood in stool. Negative for abdominal distention, constipation, diarrhea, nausea and vomiting.   Genitourinary: Negative for decreased urine volume and difficulty urinating.   Musculoskeletal: Negative for arthralgias and back pain.   Skin: Negative for color change and pallor.   Neurological: Positive for weakness. Negative for dizziness and light-headedness.   Psychiatric/Behavioral: Negative for agitation and confusion.     Objective:     Vital Signs (Most Recent):  Temp: 97.9 °F (36.6 °C) (08/18/19 1100)  Pulse: (!) 58 (08/18/19 1200)  Resp: 16 (08/18/19 1200)  BP: (!) 158/62 (08/18/19 1200)  SpO2: 100 % (08/18/19 1200) Vital Signs (24h Range):  Temp:  [97.7 °F (36.5 °C)-98.2 °F (36.8 °C)] 97.9 °F (36.6 °C)  Pulse:  [] 58  Resp:  [12-35] 16  SpO2:  [95 %-100 %] 100 %  BP: ()/(47-84) 158/62     Weight: 91.3 kg (201 lb 4.5 oz) (08/17/19 2245)  Body mass index is 31.52 kg/m².      Intake/Output Summary (Last 24  hours) at 8/18/2019 1222  Last data filed at 8/18/2019 1100  Gross per 24 hour   Intake 370 ml   Output 600 ml   Net -230 ml       Lines/Drains/Airways     Peripheral Intravenous Line                 Peripheral IV - Single Lumen 08/17/19 1320 18 G Right Antecubital less than 1 day         Peripheral IV - Single Lumen 08/17/19 1325 20 G Right Hand less than 1 day                Physical Exam   Constitutional: He is oriented to person, place, and time. He appears well-developed and well-nourished.   HENT:   Mouth/Throat: Oropharynx is clear and moist.   Eyes: No scleral icterus.   Cardiovascular: Normal rate and regular rhythm.   Pulmonary/Chest: Effort normal and breath sounds normal.   Abdominal: Soft. He exhibits no distension and no mass. There is no tenderness. There is no guarding.   Musculoskeletal: He exhibits no edema or deformity.   Neurological: He is alert and oriented to person, place, and time.   Skin: Skin is warm and dry.   Psychiatric: He has a normal mood and affect. His behavior is normal.   Vitals reviewed.      Significant Labs:  CBC:   Recent Labs   Lab 08/17/19  2302 08/18/19  0446 08/18/19  1114   WBC 12.38  12.38 8.40  8.40 6.24   HGB 10.6*  10.6* 9.4*  9.4* 8.4*   HCT 32.3*  32.3* 27.7*  27.7* 25.5*   *  112* 112*  112* 100*     CMP:   Recent Labs   Lab 08/18/19  0446      CALCIUM 7.9*   ALBUMIN 2.4*   PROT 4.3*      K 4.2   CO2 22*      BUN 13   CREATININE 0.8   ALKPHOS 34*   ALT 14   AST 16   BILITOT 1.4*     Coagulation:   Recent Labs   Lab 08/17/19  2302   INR 1.1         Assessment/Plan:     * GI bleed  73 yo M with hx of CAD and CABG (on ASA and plavix) and chronic hip pain who was transferred from [facility] for GI bleed. He reportedly had an EGD and colonoscopy three days ago that were normal, except for internal hemorrhoids. We do not have the reports here or access to the reports. His gastroenterologist suggested he may be having a small bowel  bleed. We will further evaluate with antegrade double balloon enteroscopy tomorrow. He may have a small bowel ulcer from the NSAID use with bleeding exacerbated by aspirin and plavix.     Recommendations:  - Full liquid diet today  - NPO after 10pm  - Plan for antegrade double balloon tomorrow  - Continue PPI  - Keep Hg>7        Thank you for your consult. I will follow-up with patient. Please contact us if you have any additional questions.    Indra Nicolas MD PGY-VI  Gastroenterology Fellow  [facility]  P [phone]

## 2019-08-18 NOTE — SUBJECTIVE & OBJECTIVE
Past Medical History:   Diagnosis Date    Coronary artery disease     Diverticulosis     Encounter for blood transfusion     Hypertension        Past Surgical History:   Procedure Laterality Date    CARDIAC SURGERY      CABG X2    HERNIA REPAIR      VASCULAR SURGERY      sents X7       Review of patient's allergies indicates:  No Known Allergies  Family History     Problem Relation (Age of Onset)    Cancer Brother    Heart disease Father, Brother    Hypertension Brother        Tobacco Use    Smoking status: Former Smoker     Packs/day: 0.25     Years: 50.00     Pack years: 12.50    Smokeless tobacco: Former User     Quit date: 7/2/2018    Tobacco comment: cigars   Substance and Sexual Activity    Alcohol use: Not Currently    Drug use: No    Sexual activity: Yes     Partners: Female     Review of Systems   Constitutional: Positive for activity change and appetite change. Negative for chills and fever.   HENT: Negative for sore throat and trouble swallowing.    Respiratory: Negative for cough and shortness of breath.    Cardiovascular: Negative for chest pain and leg swelling.   Gastrointestinal: Positive for blood in stool. Negative for abdominal distention, constipation, diarrhea, nausea and vomiting.   Genitourinary: Negative for decreased urine volume and difficulty urinating.   Musculoskeletal: Negative for arthralgias and back pain.   Skin: Negative for color change and pallor.   Neurological: Positive for weakness. Negative for dizziness and light-headedness.   Psychiatric/Behavioral: Negative for agitation and confusion.     Objective:     Vital Signs (Most Recent):  Temp: 97.9 °F (36.6 °C) (08/18/19 1100)  Pulse: (!) 58 (08/18/19 1200)  Resp: 16 (08/18/19 1200)  BP: (!) 158/62 (08/18/19 1200)  SpO2: 100 % (08/18/19 1200) Vital Signs (24h Range):  Temp:  [97.7 °F (36.5 °C)-98.2 °F (36.8 °C)] 97.9 °F (36.6 °C)  Pulse:  [] 58  Resp:  [12-35] 16  SpO2:  [95 %-100 %] 100 %  BP: ()/(47-84)  158/62     Weight: 91.3 kg (201 lb 4.5 oz) (08/17/19 8755)  Body mass index is 31.52 kg/m².      Intake/Output Summary (Last 24 hours) at 8/18/2019 1222  Last data filed at 8/18/2019 1100  Gross per 24 hour   Intake 370 ml   Output 600 ml   Net -230 ml       Lines/Drains/Airways     Peripheral Intravenous Line                 Peripheral IV - Single Lumen 08/17/19 1320 18 G Right Antecubital less than 1 day         Peripheral IV - Single Lumen 08/17/19 1325 20 G Right Hand less than 1 day                Physical Exam   Constitutional: He is oriented to person, place, and time. He appears well-developed and well-nourished.   HENT:   Mouth/Throat: Oropharynx is clear and moist.   Eyes: No scleral icterus.   Cardiovascular: Normal rate and regular rhythm.   Pulmonary/Chest: Effort normal and breath sounds normal.   Abdominal: Soft. He exhibits no distension and no mass. There is no tenderness. There is no guarding.   Musculoskeletal: He exhibits no edema or deformity.   Neurological: He is alert and oriented to person, place, and time.   Skin: Skin is warm and dry.   Psychiatric: He has a normal mood and affect. His behavior is normal.   Vitals reviewed.      Significant Labs:  CBC:   Recent Labs   Lab 08/17/19  2302 08/18/19  0446 08/18/19  1114   WBC 12.38  12.38 8.40  8.40 6.24   HGB 10.6*  10.6* 9.4*  9.4* 8.4*   HCT 32.3*  32.3* 27.7*  27.7* 25.5*   *  112* 112*  112* 100*     CMP:   Recent Labs   Lab 08/18/19 0446      CALCIUM 7.9*   ALBUMIN 2.4*   PROT 4.3*      K 4.2   CO2 22*      BUN 13   CREATININE 0.8   ALKPHOS 34*   ALT 14   AST 16   BILITOT 1.4*     Coagulation:   Recent Labs   Lab 08/17/19 2302   INR 1.1

## 2019-08-18 NOTE — DISCHARGE SUMMARY
[facility] Medicine  Discharge Summary      Patient Name: Jovan Davila  MRN: [id number]  Admission Date: 8/17/2019  Hospital Length of Stay: 0 days  Discharge Date and Time:  08/17/2019 9:23 PM  Attending Physician: Joyce Crowder MD   Discharging Provider: Marie Hill NP  Primary Care Provider: Jewel العلي MD      HPI:   Mr. Davila presents today with complaints of rectal bleeding. It is moderate. It is associated with weakness and intermittent dizziness. He denies chest pain, SOB, fever, chills, N/V/D, or abd pain. About a month ago he was put on mobic for his left hip pain. He then came to the hospital on 8/1 for chest pain with a negative work up/stress test. He was discharged with his home meds and continued to take asa, plavix, and mobic. He developed maroon stools on Wed and went to [facility], where he was admitted and had a EGD and colonoscopy. He reports they were unable to find the source of bleeding from this, and recommended a pill enteroscopy if symptoms persisted. He was symptom free with a stable H&H and sent home and continued his asa, plavix, and mobic. He developed bright red and maroon, clotted stool today and came to the ED. In the ED, he had and episode of moderate volume bloody stool. He has a history of CAD s/p CABGx2 with last one in 2016 and multiple stents prior to his CABG. He has an inguinal hernia that is due to be repaired on the 30th. He has chronic left hip pain.    * No surgery found *      Hospital Course:   Pt never made it to the floor and remains in the ED. Alerted by nurse that patient became dizzy when standing and had 3 episodes of hematochezia. He became hypotensive 90/52 which improved with lying flat and IV fluid bolus. On exam, he is pale, diaphoretic, with blood stained undergarments and bed. I spoke with Dr. Mattson who recommends a stat bleeding scan or IR angiography and transfer to a higher level of care to [facility]. I ordered 2  units PRBCs to be given stat and this was started by the ER nurse. After receiving 1 unit of blood he had another episode of hematochezia and became hypotensive 80/50. The second unit of blood was started and patient's BP is 105/59 HR 80. He remains pale and diaphoretic. EMS arrived to ED to transfer patient.    Discharge Exam:  General: pt pale, diaphoretic  GI: Bright red and maroon stool in brief    Critical care time: 65 min     Consults:   Consults (From admission, onward)        Status Ordering Provider     Inpatient consult to Gastroenterology  Once     Provider:  Sher Mattson Jr., MD    Acknowledged LESLY ESPANA          * GI bleeding  Will be transferred to [facility] with accepting physician, Dr. Michael        Hypertension  Resolved, now hypotensive      Acute blood loss anemia  POCT hematocrit is 19 and transfused 2 units PRBCs        Final Active Diagnoses:    Diagnosis Date Noted POA    PRINCIPAL PROBLEM:  GI bleeding [K92.2] 11/11/2013 Yes    Acute blood loss anemia [D62] 08/17/2019 Yes    Hypertension [I10] 08/17/2019 Yes      Problems Resolved During this Admission:    Diagnosis Date Noted Date Resolved POA    Bradycardia [R00.1] 10/26/2016 08/17/2019 Yes       Discharged Condition: fair    Disposition: Another Health Care Inst*    Follow Up:    Patient Instructions:      Diet NPO     Other restrictions (specify):   Order Comments: Per [facility]       Significant Diagnostic Studies: Labs:   CMP   Recent Labs   Lab 08/17/19  1330      K 4.4      CO2 26      BUN 11   CREATININE 0.8   CALCIUM 9.0   PROT 5.9*   ALBUMIN 3.3*   BILITOT 0.6   ALKPHOS 41*   AST 22   ALT 21   ANIONGAP 6*   ESTGFRAFRICA >60.0   EGFRNONAA >60.0   , CBC   Recent Labs   Lab 08/17/19  1330 08/17/19  1908   WBC 4.44  --    HGB 10.3*  --    HCT 30.6* 19*   *  --     and INR   Lab Results   Component Value Date    INR 1.1 08/17/2019     Radiology: CT scan: CT ABDOMEN PELVIS WITHOUT CONTRAST:  No results found for this visit on 08/17/19.    Pending Diagnostic Studies:     None         Medications:  Reconciled Home Medications:      Medication List      CONTINUE taking these medications    atorvastatin 40 MG tablet  Commonly known as:  LIPITOR  Take 40 mg by mouth once daily.     citalopram 40 MG tablet  Commonly known as:  CELEXA  Take 20 mg by mouth once daily.     multivitamin with minerals tablet  Take 1 tablet by mouth once daily.     omeprazole 40 MG capsule  Commonly known as:  PRILOSEC  Take 40 mg by mouth once daily.     tamsulosin 0.4 mg Cap  Commonly known as:  FLOMAX  Take 0.4 mg by mouth once daily.     temazepam 7.5 MG Cap  Commonly known as:  RESTORIL  Take 15 mg by mouth nightly as needed.        STOP taking these medications    aspirin 81 MG EC tablet  Commonly known as:  ECOTRIN     clopidogrel 75 mg tablet  Commonly known as:  PLAVIX     fish oil-omega-3 fatty acids 300-1,000 mg capsule     folic acid 1 MG tablet  Commonly known as:  FOLVITE     isosorbide mononitrate 120 MG 24 hr tablet  Commonly known as:  IMDUR  Notes to patient:  Pt states he's stopped taking this medicine for months - defer to cardiology for resuming this     lisinopril 5 MG tablet  Commonly known as:  PRINIVIL,ZESTRIL  Notes to patient:  Pt states he's stopped taking this medicine for months - defer to cardiology for resuming this     meloxicam 7.5 MG tablet  Commonly known as:  MOBIC     TAMBOCOR 50 MG Tab  Generic drug:  flecainide  Notes to patient:  Pt states he's stopped taking this medicine for months - defer to cardiology for resuming this            Indwelling Lines/Drains at time of discharge:   Lines/Drains/Airways          None          Time spent on the discharge of patient: 35 minutes  Patient was seen and examined on the date of discharge and determined to be suitable for discharge.         Marie Hill NP  Department of Hospital Medicine  [facility]

## 2019-08-18 NOTE — PLAN OF CARE
Problem: Adult Inpatient Plan of Care  Goal: Plan of Care Review  Outcome: Ongoing (interventions implemented as appropriate)  No acute events throughout day. See vital signs and assessments in flowsheets. See below for updates on today's progress.     Pulmonary: 2L NC. SpO2 > 94%. Lung sounds clear. No SOB.    Cardiovascular: SB to NSR 50-60's. No ectopy or CP. Normotensive.     Neurological: AAOx4. Afebrile. Follows commands. PERRL. No c/o pain.     Gastrointestinal: 2 dark red bowel movements today. Full liquid diet tolerated well. To be NPO @ 2200 tonight.     Genitourinary: Voids per urinal. UO ~ 1.1L    Endocrine: WNL    Skin/Bath: Bruising to abdomen existed prior to admission.     Infusions: KVO. Hgb remained > 7.    Patient progressing towards goals as tolerated, plan of care communicated and reviewed with Jovan Davila. Family not present at bedside. All questions and concerns addressed. Bed in low, locked position. Call light within reach. Will continue to monitor.

## 2019-08-18 NOTE — ASSESSMENT & PLAN NOTE
Jovan Davila is a 74 y.o. who is on ASA and plavix and recently three weeks ago started daily mobic who presented to the OSH for dizziness and BRBPR. Patient with recent EGD ( although can not find the notes), colonoscopy ( with internal hemorrhoids) who no active source of bleed, transferred for capsule enteroscopy vs IR embolization. CT abdomen in OSH with diverticulosis     Plan :   - Protonix 80mg IV bolus x 40 mg BID   - Intravascular resuscitation/support with IVFs and pRBCs received 4 units before transfer.  - Serial H/H's q6  -Hold plavix and ASA for now and instructed patient to avoid NSAIDS  - has 2 18 gauges   -NPO   - GI consult in the morning

## 2019-08-18 NOTE — ED NOTES
Pt has received 4 units of blood, his blood pressure is 129/61 and he is AAO, EMS at bedside for transport.

## 2019-08-19 ENCOUNTER — ANESTHESIA EVENT (OUTPATIENT)
Dept: ENDOSCOPY | Facility: HOSPITAL | Age: 74
DRG: 394 | End: 2019-08-19
Payer: MEDICARE

## 2019-08-19 ENCOUNTER — ANESTHESIA (OUTPATIENT)
Dept: ENDOSCOPY | Facility: HOSPITAL | Age: 74
DRG: 394 | End: 2019-08-19
Payer: MEDICARE

## 2019-08-19 LAB
ALBUMIN SERPL BCP-MCNC: 2.4 G/DL (ref 3.5–5.2)
ALP SERPL-CCNC: 35 U/L (ref 55–135)
ALT SERPL W/O P-5'-P-CCNC: 13 U/L (ref 10–44)
ANION GAP SERPL CALC-SCNC: 5 MMOL/L (ref 8–16)
AST SERPL-CCNC: 14 U/L (ref 10–40)
BASOPHILS # BLD AUTO: 0.02 K/UL (ref 0–0.2)
BASOPHILS # BLD AUTO: 0.03 K/UL (ref 0–0.2)
BASOPHILS # BLD AUTO: 0.04 K/UL (ref 0–0.2)
BASOPHILS NFR BLD: 0.3 % (ref 0–1.9)
BASOPHILS NFR BLD: 0.3 % (ref 0–1.9)
BASOPHILS NFR BLD: 0.6 % (ref 0–1.9)
BILIRUB SERPL-MCNC: 0.9 MG/DL (ref 0.1–1)
BUN SERPL-MCNC: 11 MG/DL (ref 8–23)
CALCIUM SERPL-MCNC: 8.2 MG/DL (ref 8.7–10.5)
CHLORIDE SERPL-SCNC: 111 MMOL/L (ref 95–110)
CO2 SERPL-SCNC: 24 MMOL/L (ref 23–29)
CREAT SERPL-MCNC: 0.7 MG/DL (ref 0.5–1.4)
DIFFERENTIAL METHOD: ABNORMAL
EOSINOPHIL # BLD AUTO: 0.1 K/UL (ref 0–0.5)
EOSINOPHIL # BLD AUTO: 0.2 K/UL (ref 0–0.5)
EOSINOPHIL # BLD AUTO: 0.4 K/UL (ref 0–0.5)
EOSINOPHIL NFR BLD: 0.6 % (ref 0–8)
EOSINOPHIL NFR BLD: 3.3 % (ref 0–8)
EOSINOPHIL NFR BLD: 5.1 % (ref 0–8)
ERYTHROCYTE [DISTWIDTH] IN BLOOD BY AUTOMATED COUNT: 14.5 % (ref 11.5–14.5)
ERYTHROCYTE [DISTWIDTH] IN BLOOD BY AUTOMATED COUNT: 14.5 % (ref 11.5–14.5)
ERYTHROCYTE [DISTWIDTH] IN BLOOD BY AUTOMATED COUNT: 14.6 % (ref 11.5–14.5)
EST. GFR  (AFRICAN AMERICAN): >60 ML/MIN/1.73 M^2
EST. GFR  (NON AFRICAN AMERICAN): >60 ML/MIN/1.73 M^2
GLUCOSE SERPL-MCNC: 91 MG/DL (ref 70–110)
HCT VFR BLD AUTO: 23.9 % (ref 40–54)
HCT VFR BLD AUTO: 25.5 % (ref 40–54)
HCT VFR BLD AUTO: 26.7 % (ref 40–54)
HGB BLD-MCNC: 8.1 G/DL (ref 14–18)
HGB BLD-MCNC: 8.5 G/DL (ref 14–18)
HGB BLD-MCNC: 8.9 G/DL (ref 14–18)
IMM GRANULOCYTES # BLD AUTO: 0.01 K/UL (ref 0–0.04)
IMM GRANULOCYTES # BLD AUTO: 0.02 K/UL (ref 0–0.04)
IMM GRANULOCYTES # BLD AUTO: 0.03 K/UL (ref 0–0.04)
IMM GRANULOCYTES NFR BLD AUTO: 0.1 % (ref 0–0.5)
IMM GRANULOCYTES NFR BLD AUTO: 0.3 % (ref 0–0.5)
IMM GRANULOCYTES NFR BLD AUTO: 0.3 % (ref 0–0.5)
LYMPHOCYTES # BLD AUTO: 0.8 K/UL (ref 1–4.8)
LYMPHOCYTES # BLD AUTO: 1.6 K/UL (ref 1–4.8)
LYMPHOCYTES # BLD AUTO: 2.4 K/UL (ref 1–4.8)
LYMPHOCYTES NFR BLD: 24.5 % (ref 18–48)
LYMPHOCYTES NFR BLD: 34.4 % (ref 18–48)
LYMPHOCYTES NFR BLD: 7.7 % (ref 18–48)
MAGNESIUM SERPL-MCNC: 1.8 MG/DL (ref 1.6–2.6)
MCH RBC QN AUTO: 29.2 PG (ref 27–31)
MCH RBC QN AUTO: 29.5 PG (ref 27–31)
MCH RBC QN AUTO: 29.5 PG (ref 27–31)
MCHC RBC AUTO-ENTMCNC: 33.3 G/DL (ref 32–36)
MCHC RBC AUTO-ENTMCNC: 33.3 G/DL (ref 32–36)
MCHC RBC AUTO-ENTMCNC: 33.9 G/DL (ref 32–36)
MCV RBC AUTO: 86 FL (ref 82–98)
MCV RBC AUTO: 88 FL (ref 82–98)
MCV RBC AUTO: 89 FL (ref 82–98)
MONOCYTES # BLD AUTO: 0.3 K/UL (ref 0.3–1)
MONOCYTES # BLD AUTO: 0.6 K/UL (ref 0.3–1)
MONOCYTES # BLD AUTO: 0.7 K/UL (ref 0.3–1)
MONOCYTES NFR BLD: 10.7 % (ref 4–15)
MONOCYTES NFR BLD: 3.3 % (ref 4–15)
MONOCYTES NFR BLD: 9.2 % (ref 4–15)
NEUTROPHILS # BLD AUTO: 3.4 K/UL (ref 1.8–7.7)
NEUTROPHILS # BLD AUTO: 4 K/UL (ref 1.8–7.7)
NEUTROPHILS # BLD AUTO: 9.1 K/UL (ref 1.8–7.7)
NEUTROPHILS NFR BLD: 49.1 % (ref 38–73)
NEUTROPHILS NFR BLD: 62.4 % (ref 38–73)
NEUTROPHILS NFR BLD: 87.8 % (ref 38–73)
NRBC BLD-RTO: 0 /100 WBC
PHOSPHATE SERPL-MCNC: 3.9 MG/DL (ref 2.7–4.5)
PLATELET # BLD AUTO: 113 K/UL (ref 150–350)
PLATELET # BLD AUTO: 117 K/UL (ref 150–350)
PLATELET # BLD AUTO: 132 K/UL (ref 150–350)
PMV BLD AUTO: 11.8 FL (ref 9.2–12.9)
PMV BLD AUTO: 11.9 FL (ref 9.2–12.9)
PMV BLD AUTO: 12.1 FL (ref 9.2–12.9)
POTASSIUM SERPL-SCNC: 4.2 MMOL/L (ref 3.5–5.1)
PROT SERPL-MCNC: 4.3 G/DL (ref 6–8.4)
RBC # BLD AUTO: 2.77 M/UL (ref 4.6–6.2)
RBC # BLD AUTO: 2.88 M/UL (ref 4.6–6.2)
RBC # BLD AUTO: 3.02 M/UL (ref 4.6–6.2)
SODIUM SERPL-SCNC: 140 MMOL/L (ref 136–145)
WBC # BLD AUTO: 10.34 K/UL (ref 3.9–12.7)
WBC # BLD AUTO: 6.38 K/UL (ref 3.9–12.7)
WBC # BLD AUTO: 6.91 K/UL (ref 3.9–12.7)

## 2019-08-19 PROCEDURE — 63600175 PHARM REV CODE 636 W HCPCS: Performed by: NURSE ANESTHETIST, CERTIFIED REGISTERED

## 2019-08-19 PROCEDURE — 20600001 HC STEP DOWN PRIVATE ROOM

## 2019-08-19 PROCEDURE — 80053 COMPREHEN METABOLIC PANEL: CPT

## 2019-08-19 PROCEDURE — 44799 ENTEROSCOPY WITH SUBMUCOSAL INJECTIONS: ICD-10-PCS | Mod: ,,, | Performed by: INTERNAL MEDICINE

## 2019-08-19 PROCEDURE — 25000003 PHARM REV CODE 250: Performed by: NURSE ANESTHETIST, CERTIFIED REGISTERED

## 2019-08-19 PROCEDURE — 85025 COMPLETE CBC W/AUTO DIFF WBC: CPT

## 2019-08-19 PROCEDURE — 84100 ASSAY OF PHOSPHORUS: CPT

## 2019-08-19 PROCEDURE — 99233 PR SUBSEQUENT HOSPITAL CARE,LEVL III: ICD-10-PCS | Mod: ,,, | Performed by: PHYSICIAN ASSISTANT

## 2019-08-19 PROCEDURE — 44376 SMALL BOWEL ENDOSCOPY: CPT | Performed by: INTERNAL MEDICINE

## 2019-08-19 PROCEDURE — 44376 PR SB SCOPE,TO ILEUM,DIAGNOSTIC: ICD-10-PCS | Mod: ,,, | Performed by: INTERNAL MEDICINE

## 2019-08-19 PROCEDURE — 36415 COLL VENOUS BLD VENIPUNCTURE: CPT

## 2019-08-19 PROCEDURE — 37000009 HC ANESTHESIA EA ADD 15 MINS: Performed by: INTERNAL MEDICINE

## 2019-08-19 PROCEDURE — 44799 UNLISTED PX SMALL INTESTINE: CPT | Mod: ,,, | Performed by: INTERNAL MEDICINE

## 2019-08-19 PROCEDURE — 27201030 HC OVERTUBE: Performed by: INTERNAL MEDICINE

## 2019-08-19 PROCEDURE — D9220A PRA ANESTHESIA: Mod: CRNA,,, | Performed by: NURSE ANESTHETIST, CERTIFIED REGISTERED

## 2019-08-19 PROCEDURE — C9113 INJ PANTOPRAZOLE SODIUM, VIA: HCPCS | Performed by: STUDENT IN AN ORGANIZED HEALTH CARE EDUCATION/TRAINING PROGRAM

## 2019-08-19 PROCEDURE — 27201028 HC NEEDLE, SCLERO: Performed by: INTERNAL MEDICINE

## 2019-08-19 PROCEDURE — D9220A PRA ANESTHESIA: ICD-10-PCS | Mod: ANES,,, | Performed by: ANESTHESIOLOGY

## 2019-08-19 PROCEDURE — 83735 ASSAY OF MAGNESIUM: CPT

## 2019-08-19 PROCEDURE — D9220A PRA ANESTHESIA: ICD-10-PCS | Mod: CRNA,,, | Performed by: NURSE ANESTHETIST, CERTIFIED REGISTERED

## 2019-08-19 PROCEDURE — 93010 ELECTROCARDIOGRAM REPORT: CPT | Mod: ,,, | Performed by: INTERNAL MEDICINE

## 2019-08-19 PROCEDURE — 63600175 PHARM REV CODE 636 W HCPCS: Performed by: STUDENT IN AN ORGANIZED HEALTH CARE EDUCATION/TRAINING PROGRAM

## 2019-08-19 PROCEDURE — 44799 UNLISTED PX SMALL INTESTINE: CPT | Performed by: INTERNAL MEDICINE

## 2019-08-19 PROCEDURE — 37000008 HC ANESTHESIA 1ST 15 MINUTES: Performed by: INTERNAL MEDICINE

## 2019-08-19 PROCEDURE — D9220A PRA ANESTHESIA: Mod: ANES,,, | Performed by: ANESTHESIOLOGY

## 2019-08-19 PROCEDURE — 44376 SMALL BOWEL ENDOSCOPY: CPT | Mod: ,,, | Performed by: INTERNAL MEDICINE

## 2019-08-19 PROCEDURE — 99233 SBSQ HOSP IP/OBS HIGH 50: CPT | Mod: ,,, | Performed by: PHYSICIAN ASSISTANT

## 2019-08-19 PROCEDURE — 93005 ELECTROCARDIOGRAM TRACING: CPT

## 2019-08-19 PROCEDURE — 93010 EKG 12-LEAD: ICD-10-PCS | Mod: ,,, | Performed by: INTERNAL MEDICINE

## 2019-08-19 RX ORDER — ONDANSETRON 2 MG/ML
INJECTION INTRAMUSCULAR; INTRAVENOUS
Status: DISCONTINUED | OUTPATIENT
Start: 2019-08-19 | End: 2019-08-19

## 2019-08-19 RX ORDER — GLYCOPYRROLATE 0.2 MG/ML
INJECTION INTRAMUSCULAR; INTRAVENOUS
Status: DISCONTINUED | OUTPATIENT
Start: 2019-08-19 | End: 2019-08-19

## 2019-08-19 RX ORDER — SUCCINYLCHOLINE CHLORIDE 20 MG/ML
INJECTION INTRAMUSCULAR; INTRAVENOUS
Status: DISCONTINUED | OUTPATIENT
Start: 2019-08-19 | End: 2019-08-19

## 2019-08-19 RX ORDER — FENTANYL CITRATE 50 UG/ML
INJECTION, SOLUTION INTRAMUSCULAR; INTRAVENOUS
Status: DISCONTINUED | OUTPATIENT
Start: 2019-08-19 | End: 2019-08-19

## 2019-08-19 RX ORDER — SODIUM CHLORIDE 9 MG/ML
INJECTION, SOLUTION INTRAVENOUS CONTINUOUS PRN
Status: DISCONTINUED | OUTPATIENT
Start: 2019-08-19 | End: 2019-08-19

## 2019-08-19 RX ORDER — ROCURONIUM BROMIDE 10 MG/ML
INJECTION, SOLUTION INTRAVENOUS
Status: DISCONTINUED | OUTPATIENT
Start: 2019-08-19 | End: 2019-08-19

## 2019-08-19 RX ORDER — PROPOFOL 10 MG/ML
INJECTION, EMULSION INTRAVENOUS
Status: DISCONTINUED | OUTPATIENT
Start: 2019-08-19 | End: 2019-08-19

## 2019-08-19 RX ORDER — PHENYLEPHRINE HYDROCHLORIDE 10 MG/ML
INJECTION INTRAVENOUS
Status: DISCONTINUED | OUTPATIENT
Start: 2019-08-19 | End: 2019-08-19

## 2019-08-19 RX ORDER — DEXAMETHASONE SODIUM PHOSPHATE 4 MG/ML
INJECTION, SOLUTION INTRA-ARTICULAR; INTRALESIONAL; INTRAMUSCULAR; INTRAVENOUS; SOFT TISSUE
Status: DISCONTINUED | OUTPATIENT
Start: 2019-08-19 | End: 2019-08-19

## 2019-08-19 RX ORDER — LIDOCAINE HCL/PF 100 MG/5ML
SYRINGE (ML) INTRAVENOUS
Status: DISCONTINUED | OUTPATIENT
Start: 2019-08-19 | End: 2019-08-19

## 2019-08-19 RX ORDER — SODIUM CHLORIDE 0.9 % (FLUSH) 0.9 %
10 SYRINGE (ML) INJECTION
Status: DISCONTINUED | OUTPATIENT
Start: 2019-08-19 | End: 2019-08-23 | Stop reason: HOSPADM

## 2019-08-19 RX ADMIN — PROPOFOL 50 MG: 10 INJECTION, EMULSION INTRAVENOUS at 03:08

## 2019-08-19 RX ADMIN — GLYCOPYRROLATE 0.1 MG: 0.2 INJECTION, SOLUTION INTRAMUSCULAR; INTRAVENOUS at 04:08

## 2019-08-19 RX ADMIN — FENTANYL CITRATE 50 MCG: 50 INJECTION, SOLUTION INTRAMUSCULAR; INTRAVENOUS at 03:08

## 2019-08-19 RX ADMIN — PROPOFOL 150 MG: 10 INJECTION, EMULSION INTRAVENOUS at 03:08

## 2019-08-19 RX ADMIN — PHENYLEPHRINE HYDROCHLORIDE 100 MCG: 10 INJECTION INTRAVENOUS at 03:08

## 2019-08-19 RX ADMIN — PHENYLEPHRINE HYDROCHLORIDE 100 MCG: 10 INJECTION INTRAVENOUS at 04:08

## 2019-08-19 RX ADMIN — ONDANSETRON 4 MG: 2 INJECTION INTRAMUSCULAR; INTRAVENOUS at 04:08

## 2019-08-19 RX ADMIN — DEXAMETHASONE SODIUM PHOSPHATE 4 MG: 4 INJECTION, SOLUTION INTRAMUSCULAR; INTRAVENOUS at 04:08

## 2019-08-19 RX ADMIN — LIDOCAINE HYDROCHLORIDE 60 MG: 20 INJECTION, SOLUTION INTRAVENOUS at 03:08

## 2019-08-19 RX ADMIN — ROCURONIUM BROMIDE 5 MG: 10 INJECTION, SOLUTION INTRAVENOUS at 03:08

## 2019-08-19 RX ADMIN — LIDOCAINE HYDROCHLORIDE 40 MG: 20 INJECTION, SOLUTION INTRAVENOUS at 03:08

## 2019-08-19 RX ADMIN — PANTOPRAZOLE SODIUM 40 MG: 40 INJECTION, POWDER, LYOPHILIZED, FOR SOLUTION INTRAVENOUS at 09:08

## 2019-08-19 RX ADMIN — SUCCINYLCHOLINE CHLORIDE 120 MG: 20 INJECTION, SOLUTION INTRAMUSCULAR; INTRAVENOUS at 03:08

## 2019-08-19 RX ADMIN — SODIUM CHLORIDE: 0.9 INJECTION, SOLUTION INTRAVENOUS at 03:08

## 2019-08-19 NOTE — SUBJECTIVE & OBJECTIVE
Interval History/Significant Events: No acute events overnight. 1 blood bowel movement overnight.     Review of Systems   Constitutional: Negative for appetite change and unexpected weight change.   HENT: Negative for sneezing and voice change.    Eyes: Negative for discharge and itching.   Respiratory: Negative for choking, chest tightness and shortness of breath.    Cardiovascular: Negative for chest pain and leg swelling.   Gastrointestinal: Positive for blood in stool. Negative for abdominal pain, nausea and rectal pain.   Endocrine: Negative for cold intolerance and heat intolerance.   Genitourinary: Negative for frequency and urgency.   Musculoskeletal: Negative for arthralgias and neck stiffness.   Skin: Negative for pallor and rash.   Neurological: Negative for tremors and light-headedness.   Hematological: Does not bruise/bleed easily.   Psychiatric/Behavioral: Negative for decreased concentration and dysphoric mood.     Objective:     Vital Signs (Most Recent):  Temp: 98 °F (36.7 °C) (08/19/19 0700)  Pulse: (!) 56 (08/19/19 0700)  Resp: 17 (08/19/19 0700)  BP: (!) 154/70 (08/19/19 0700)  SpO2: 100 % (08/19/19 0700) Vital Signs (24h Range):  Temp:  [97.8 °F (36.6 °C)-98.1 °F (36.7 °C)] 98 °F (36.7 °C)  Pulse:  [50-70] 56  Resp:  [12-20] 17  SpO2:  [96 %-100 %] 100 %  BP: (106-171)/(58-78) 154/70   Weight: 91.3 kg (201 lb 4.5 oz)  Body mass index is 31.52 kg/m².      Intake/Output Summary (Last 24 hours) at 8/19/2019 0828  Last data filed at 8/19/2019 0545  Gross per 24 hour   Intake 1387 ml   Output 2775 ml   Net -1388 ml       Physical Exam   Constitutional: He is oriented to person, place, and time. He appears well-developed and well-nourished. Nasal cannula in place.   HENT:   Head: Normocephalic and atraumatic.   Eyes: Pupils are equal, round, and reactive to light. EOM are normal.   Neck: No tracheal deviation present. No thyromegaly present.   Cardiovascular: Regular rhythm. Bradycardia present. Exam  reveals no gallop and no friction rub.   No murmur heard.  Pulmonary/Chest: Effort normal and breath sounds normal. No stridor. He has no decreased breath sounds. He has no wheezes. He has no rhonchi. He has no rales.   Abdominal: Soft. Bowel sounds are normal. There is no tenderness.   Musculoskeletal: Normal range of motion.   Neurological: He is alert and oriented to person, place, and time.   Skin: Skin is warm and dry.   Psychiatric: He has a normal mood and affect. His speech is normal and behavior is normal.        Lines/Drains/Airways     Peripheral Intravenous Line                 Peripheral IV - Single Lumen 08/17/19 1320 18 G Right Antecubital 1 day         Peripheral IV - Single Lumen 08/17/19 1325 20 G Right Hand 1 day              Significant Labs:    CBC/Anemia Profile:  Recent Labs   Lab 08/17/19  1422  08/18/19  0446 08/18/19  1114 08/18/19  1749 08/18/19  2148 08/19/19  0238   WBC  --    < > 8.40  8.40 6.24  --   --  6.91   HGB  --    < > 9.4*  9.4* 8.4* 8.7* 8.7* 8.1*   HCT  --    < > 27.7*  27.7* 25.5*  --   --  23.9*   PLT  --    < > 112*  112* 100*  --   --  113*   MCV  --    < > 86  86 88  --   --  86   RDW  --    < > 14.4  14.4 14.8*  --   --  14.5   OCCULTBLOOD Positive*  --   --   --   --   --   --     < > = values in this interval not displayed.        Chemistries:  Recent Labs   Lab 08/17/19  2302 08/18/19  0446 08/19/19  0238    138 140   K 4.3 4.2 4.2   * 110 111*   CO2 22* 22* 24   BUN 12 13 11   CREATININE 0.8 0.8 0.7   CALCIUM 8.1* 7.9* 8.2*   ALBUMIN 2.4* 2.4* 2.4*   PROT 4.5* 4.3* 4.3*   BILITOT 1.8* 1.4* 0.9   ALKPHOS 41* 34* 35*   ALT 16 14 13   AST 17 16 14   MG 1.6 1.7 1.8   PHOS 2.8 4.1 3.9       All pertinent labs within the past 24 hours have been reviewed.    Significant Imaging:  I have reviewed and interpreted all pertinent imaging results/findings within the past 24 hours.

## 2019-08-19 NOTE — ASSESSMENT & PLAN NOTE
Jovan Davila is a 74 y.o. who is on ASA and plavix and recently three weeks ago started daily mobic who presented to the OSH for dizziness and BRBPR. Patient with recent EGD (although no access to documentation), colonoscopy (with internal hemorrhoids) with no active source of bleed, transferred for capsule enteroscopy vs IR embolization. CT abdomen in OSH with diverticulosis. Intravascular resuscitation/support with IVFs and pRBCs received 4 units before transfer.    --Continue PPI 40 mg IV BID  --CBC q12  --Hold plavix and ASA for now and instructed patient to avoid NSAIDS  --has 2 18 gauges   --NPO at 10PM last night  --GI following with plans for antegrade double balloon enteroscopy today.

## 2019-08-19 NOTE — PLAN OF CARE
CM met with patient to obtain discharge planning assessment.  Patient admitted with GI Bleed.  Planned discharge is home with family - Plan (A) or home with family and home health - Plan (B).    Patient provided with [facility] Packet.    PCP:  Jewel العلي MD     Payor:  Payor: MEDICARE / Plan: MEDICARE PART A & B / Product Type: Government /      Pharmacy:    [id number] - [place], [place] - [address]  [address]  [place] [address]  Phone: [phone] Fax: [phone]       08/19/19 133   Discharge Assessment   Assessment Type Discharge Planning Assessment   Confirmed/corrected address and phone number on facesheet? Yes   Assessment information obtained from? Patient   Expected Length of Stay (days) 2   Communicated expected length of stay with patient/caregiver yes   Prior to hospitilization cognitive status: Alert/Oriented   Prior to hospitalization functional status: Independent   Current cognitive status: Alert/Oriented   Current Functional Status: Independent   Lives With spouse   Able to Return to Prior Arrangements yes   Is patient able to care for self after discharge? Yes   Who are your caregiver(s) and their phone number(s)? daughter and son   Patient's perception of discharge disposition home or selfcare   Readmission Within the Last 30 Days no previous admission in last 30 days   Patient currently being followed by outpatient case management? No   Patient currently receives home health services? No   Patient currently receives any other outside agency services? No   Equipment Currently Used at Home none   Do you have any problems affording any of your prescribed medications? No   Is the patient taking medications as prescribed? yes   Does the patient have transportation home? Yes   Transportation Anticipated family or friend will provide   Does the patient receive services at the [facility]mad[facility] Clinic? No   Discharge Plan A Home with family   Discharge Plan B Home with family;Home  Health   DME Needed Upon Discharge  none   Patient/Family in Agreement with Plan yes

## 2019-08-19 NOTE — PLAN OF CARE
Problem: Bleeding (Gastrointestinal Bleeding)  Goal: Hemostasis    Intervention: Manage Gastrointestinal Bleeding    No acute events throughout day. See vital signs and assessments in flowsheets. See below for updates on today's progress.   Pulmonary: Remains on 2 L NC. SpO2 >95 %.  Cardiovascular: SR to SB.   Neurological: AAO x 4  Gastrointestinal: Full liquid diet until 2200. No BM throughout shift. LBM 8/18/19 at 1900--BM was dark red in color.  Genitourinary: voids per urinal. See flowsheet for UO   Date of last CHG bath given: 8/18/19 PM shift   Infusions: KVO   Plan of care communicated and reviewed with Jovan Davila. All concerns addressed. Will continue to monitor.   A: Awake    RASS: Goal - 0  alert and calm Actual - 0  alert and calm   Restraint necessity: no  B: Breath   SBT: NA  C: Coordinate A & B, analgesics/sedatives   Pain: managed   SAT: NA  D: Delirium   CAM-ICU: negative  E: Early Mobility   MOVE Screen: Pass  FAS: Feeding/Nutrition   Diet order: NPO  Fluid restriction: None  T: Thrombus   DVT prophylaxis: NA  H: HOB Elevation   30 degrees: Yes   U: Ulcer Prophylaxis   GI: yes  G: Glucose control   managed  S: Skin   Bundle compliance: yes  B: Bowel Function   GI bleed   I: Indwelling Catheters   Delgado necessity: No  D: De-escalation Antibx   No  Plan for the day   Antegrade Double Ballon Scope   Family/Goals of care/Code Status   Code Status: Full Code

## 2019-08-19 NOTE — NURSING
Dr. Boone notified patient said he was experiencing chest pain in L chest at level of 2/10. He said it was not new. MD noted on monitor some PVCs and ordered for EKG.

## 2019-08-19 NOTE — [employer] PATIENT INSTRUCTIONS
Discharge Summary/Instructions after an Endoscopic Procedure  Patient Name: Jovan Davila  Patient MRN: [id number]  Patient YOB: 1945 Monday, August 19, 2019  Nolan Rivas MD  RESTRICTIONS:  During your procedure today, you received medications for sedation.  These   medications may affect your judgment, balance and coordination.  Therefore,   for 24 hours, you have the following restrictions:   - DO NOT drive a car, operate machinery, make legal/financial decisions,   sign important papers or drink alcohol.    ACTIVITY:  Today: no heavy lifting, straining or running due to procedural   sedation/anesthesia.  The following day: return to full activity including work.  DIET:  Eat and drink normally unless instructed otherwise.     TREATMENT FOR COMMON SIDE EFFECTS:  - Mild abdominal pain, nausea, belching, bloating or excessive gas:  rest,   eat lightly and use a heating pad.  - Sore Throat: treat with throat lozenges and/or gargle with warm salt   water.  - Because air was used during the procedure, expelling large amounts of air   from your rectum or belching is normal.  - If a bowel prep was taken, you may not have a bowel movement for 1-3 days.    This is normal.  SYMPTOMS TO WATCH FOR AND REPORT TO YOUR PHYSICIAN:  1. Abdominal pain or bloating, other than gas cramps.  2. Chest pain.  3. Back pain.  4. Signs of infection such as: chills or fever occurring within 24 hours   after the procedure.  5. Rectal bleeding, which would show as bright red, maroon, or black stools.   (A tablespoon of blood from the rectum is not serious, especially if   hemorrhoids are present.)  6. Vomiting.  7. Weakness or dizziness.  GO DIRECTLY TO THE NEAREST EMERGENCY ROOM IF YOU HAVE ANY OF THE FOLLOWING:      Difficulty breathing              Chills and/or fever over 101 F   Persistent vomiting and/or vomiting blood   Severe abdominal pain   Severe chest pain   Black, tarry stools   Bleeding- more than one  tablespoon   Any other symptom or condition that you feel may need urgent attention  Your doctor recommends these additional instructions:  If any biopsies were taken, your doctors clinic will contact you in 1 to 2   weeks with any results.  - [facility] patient to [facility] for [facility] care.   - Advance diet as tolerated.   - Continue present medications.   - Resume Plavix (clopidogrel) at prior dose.   - If he has signs of recurrent bleeding, perform video capsule endoscopy.      For questions, problems or results please call your physician - Nolan Rivas MD at Work:  [phone].  [facility], EMERGENCY ROOM PHONE NUMBER: [phone]  IF A COMPLICATION OR EMERGENCY SITUATION ARISES AND YOU ARE UNABLE TO REACH   YOUR PHYSICIAN - GO DIRECTLY TO THE EMERGENCY ROOM.  Nolan Rivas MD  8/19/2019 5:12:40 PM  This report has been verified and signed electronically.  [employer]

## 2019-08-19 NOTE — RESIDENT HANDOFF
ICU Transfer of Care Note  Critical Care Medicine    Admit Date: 8/17/2019  LOS: 2    CC: GI bleed    Code Status: Full Code     HPI and Hospital Course:     HPI:  Mr. Davila is a 74 y.o. who is a transfer from [facility] for GIB. Pativictorino has a PMH of CAD s/p CABG ( on ASA, plavix), chronic hip pain ( started mobic three weeks ago ) presented two days ago to [facility] for syncope and bleeding , he had BRBPR and underwent a colonoscopy that revealed bleeding internal hemorrhoids. Was told that he likely needed a capsule enteroscopy was discharged with follow up ( hgb at the time 9) . Today he presented to [place] for recurrent rectal bleed and intermittent dizziness.        He was found to be  hypotensive 90/52 which improved with lying flat and IV fluid bolus. CT abdomen pelvis with contrast with no source of bleed but shows diverticulosis. Was given 4 units of blood and 2oocc of fluid before transfer     Hgb on arrival was 10, last BM was before arrival     Hospital/ICU Course:  Admitted to the ICU for active GI bleed, on arrival patient HDS with MAP > 70 Hgb 10 . GI consulted with plans for antegrade double balloon enteroscopy 8/19. One blood bowel movement since admission. No transfusions here. Patient stable for step down to hospital medicine.       To Follow Up:     GI Bleed:  --GI following with plans for antegrade double balloon enteroscopy today  --NPO until procedure  --Continue PPI 40 mg IV BID  --CBC q 12 hours    CAD, Hx of CABG  --Holding ASA/plavix for active bleed. Will need to be re-started when appropriate.     HTN  --Restart medications as appropriate      Discharge Plan:     Home.    Call with questions.     Senait Avalos PA-C  Critical Care Medicine  8/19/2019   10:15 AM

## 2019-08-19 NOTE — INTERVAL H&P NOTE
Pre-Procedure H and P Addendum    Patient seen and examined.  History and exam unchanged from prior history and physical.      Procedure: antegrade double-balloon enteroscopy   Indication: GI bleeding, suspected small bowel source  ASA Class: per anesthesiology  Airway: normal  Neck Mobility: full range of motion  Mallampatti score: per anesthesia  History of anesthesia problems: no  Family history of anesthesia problems: no  Anesthesia Plan: General    Anesthesia/Surgery risks, benefits and alternative options discussed and understood by patient/family.          Active Hospital Problems    Diagnosis  POA    *GI bleed [K92.2]  Yes    Hypertension [I10]  Yes    Diverticular disease of colon [K57.30]  Yes     Chronic    Coronary artery disease [I25.10]  Yes     Chronic      Resolved Hospital Problems   No resolved problems to display.

## 2019-08-19 NOTE — PROGRESS NOTES
[facility]  Critical Care Medicine  Progress Note    Patient Name: Jovan Davila  MRN: [id number]  Admission Date: 8/17/2019  Hospital Length of Stay: 2 days  Code Status: Full Code  Attending Provider: Kylee Massey MD  Primary Care Provider: Jewel العلي MD   Principal Problem: GI bleed    Subjective:     HPI:  Mr. Davila is a 74 y.o. who is a transfer from [facility] for GIB. Patien has a PMH of CAD s/p CABG ( on ASA, plavix), chronic hip pain ( started mobic three weeks ago ) presented two days ago to [facility] for syncope and bleeding , he had BRBPR and underwent a colonoscopy that revealed bleeding internal hemorrhoids. Was told that he likely needed a capsule enteroscopy was discharged with follow up ( hgb at the time 9) . Today he presented to [place] for recurrent rectal bleed and intermittent dizziness.        He was found to be  hypotensive 90/52 which improved with lying flat and IV fluid bolus. CT abdomen pelvis with contrast with no source of bleed but shows diverticulosis. Was given 4 units of blood and 2oocc of fluid before transfer     Hgb on arrival was 10, last BM was before arrival     Hospital/ICU Course:  Admitted to the ICU for active GI bleed, on arrival patient HDS with MAP > 70 Hgb 10 . GI consulted with plans for antegrade double balloon enteroscopy 8/19. One blood bowel movement since admission. No transfusions here. Patient stable for step down to hospital medicine.     Interval History/Significant Events: No acute events overnight. 1 blood bowel movement overnight.     Review of Systems   Constitutional: Negative for appetite change and unexpected weight change.   HENT: Negative for sneezing and voice change.    Eyes: Negative for discharge and itching.   Respiratory: Negative for choking, chest tightness and shortness of breath.    Cardiovascular: Negative for chest pain and leg swelling.   Gastrointestinal: Positive for blood in stool. Negative for  abdominal pain, nausea and rectal pain.   Endocrine: Negative for cold intolerance and heat intolerance.   Genitourinary: Negative for frequency and urgency.   Musculoskeletal: Negative for arthralgias and neck stiffness.   Skin: Negative for pallor and rash.   Neurological: Negative for tremors and light-headedness.   Hematological: Does not bruise/bleed easily.   Psychiatric/Behavioral: Negative for decreased concentration and dysphoric mood.     Objective:     Vital Signs (Most Recent):  Temp: 98 °F (36.7 °C) (08/19/19 0700)  Pulse: (!) 56 (08/19/19 0700)  Resp: 17 (08/19/19 0700)  BP: (!) 154/70 (08/19/19 0700)  SpO2: 100 % (08/19/19 0700) Vital Signs (24h Range):  Temp:  [97.8 °F (36.6 °C)-98.1 °F (36.7 °C)] 98 °F (36.7 °C)  Pulse:  [50-70] 56  Resp:  [12-20] 17  SpO2:  [96 %-100 %] 100 %  BP: (106-171)/(58-78) 154/70   Weight: 91.3 kg (201 lb 4.5 oz)  Body mass index is 31.52 kg/m².      Intake/Output Summary (Last 24 hours) at 8/19/2019 0828  Last data filed at 8/19/2019 0545  Gross per 24 hour   Intake 1387 ml   Output 2775 ml   Net -1388 ml       Physical Exam   Constitutional: He is oriented to person, place, and time. He appears well-developed and well-nourished. Nasal cannula in place.   HENT:   Head: Normocephalic and atraumatic.   Eyes: Pupils are equal, round, and reactive to light. EOM are normal.   Neck: No tracheal deviation present. No thyromegaly present.   Cardiovascular: Regular rhythm. Bradycardia present. Exam reveals no gallop and no friction rub.   No murmur heard.  Pulmonary/Chest: Effort normal and breath sounds normal. No stridor. He has no decreased breath sounds. He has no wheezes. He has no rhonchi. He has no rales.   Abdominal: Soft. Bowel sounds are normal. There is no tenderness.   Musculoskeletal: Normal range of motion.   Neurological: He is alert and oriented to person, place, and time.   Skin: Skin is warm and dry.   Psychiatric: He has a normal mood and affect. His speech is  normal and behavior is normal.        Lines/Drains/Airways     Peripheral Intravenous Line                 Peripheral IV - Single Lumen 08/17/19 1320 18 G Right Antecubital 1 day         Peripheral IV - Single Lumen 08/17/19 1325 20 G Right Hand 1 day              Significant Labs:    CBC/Anemia Profile:  Recent Labs   Lab 08/17/19  1422  08/18/19  0446 08/18/19  1114 08/18/19  1749 08/18/19  2148 08/19/19  0238   WBC  --    < > 8.40  8.40 6.24  --   --  6.91   HGB  --    < > 9.4*  9.4* 8.4* 8.7* 8.7* 8.1*   HCT  --    < > 27.7*  27.7* 25.5*  --   --  23.9*   PLT  --    < > 112*  112* 100*  --   --  113*   MCV  --    < > 86  86 88  --   --  86   RDW  --    < > 14.4  14.4 14.8*  --   --  14.5   OCCULTBLOOD Positive*  --   --   --   --   --   --     < > = values in this interval not displayed.        Chemistries:  Recent Labs   Lab 08/17/19  2302 08/18/19  0446 08/19/19  0238    138 140   K 4.3 4.2 4.2   * 110 111*   CO2 22* 22* 24   BUN 12 13 11   CREATININE 0.8 0.8 0.7   CALCIUM 8.1* 7.9* 8.2*   ALBUMIN 2.4* 2.4* 2.4*   PROT 4.5* 4.3* 4.3*   BILITOT 1.8* 1.4* 0.9   ALKPHOS 41* 34* 35*   ALT 16 14 13   AST 17 16 14   MG 1.6 1.7 1.8   PHOS 2.8 4.1 3.9       All pertinent labs within the past 24 hours have been reviewed.    Significant Imaging:  I have reviewed and interpreted all pertinent imaging results/findings within the past 24 hours.      ABG  No results for input(s): PH, PO2, PCO2, HCO3, BE in the last 168 hours.  Assessment/Plan:     Cardiac/Vascular  Hypertension  - hold all BP meds as patient is normotensive    Coronary artery disease  Hx of CABG  - Hold ASA, plavix given recent GI bleed    GI  * GI bleed  Jovan Davila is a 74 y.o. who is on ASA and plavix and recently three weeks ago started daily mobic who presented to the OSH for dizziness and BRBPR. Patient with recent EGD (although no access to documentation), colonoscopy (with internal hemorrhoids) with no active source of bleed,  transferred for capsule enteroscopy vs IR embolization. CT abdomen in OSH with diverticulosis. Intravascular resuscitation/support with IVFs and pRBCs received 4 units before transfer.    --Continue PPI 40 mg IV BID  --CBC q12  --Hold plavix and ASA for now and instructed patient to avoid NSAIDS  --has 2 18 gauges   --NPO at 10PM last night  --GI following with plans for antegrade double balloon enteroscopy today.     Discussed with Dr. Massey.     Patient updated at the bedside.      I have spent 35 min with this patient, with over 50% of this time spent coordinating care and speaking with the family    Senait Avalos PA-C  Critical Care Medicine  [facility]

## 2019-08-19 NOTE — NURSING TRANSFER
Nursing Transfer Note      8/19/2019     Transfer To: endoscopy     Transfer via stretcher    Transfer with cardiac monitoring    Transported by riki, RN, Evelyn, FIONA    Medicines sent: none    Chart send with patient: Yes    Notified:      Patient reassessed at: pt. Arrived to room at approx 1545.  (8/19/19)    Upon arrival to floor: cardiac monitor applied; nurse taking over to bedside. CRNA came to bedside and pt. Switched from portable cardiac monitor to the endoscopy monitor     Prior to transfer, pt hypertensive. Team called and notified.     New bed has been assigned but it is not ready. Pt. Belongings remain in room 6087

## 2019-08-19 NOTE — TRANSFER OF CARE
"Anesthesia Transfer of Care Note    Patient: Jovan Davila    Procedure(s) Performed: Procedure(s) (LRB):  ENTEROSCOPY, DOUBLE BALLOON, ANTEGRADE (N/A)    Patient location: PACU    Anesthesia Type: general    Transport from OR: Transported from OR on 6-10 L/min O2 by face mask with adequate spontaneous ventilation    Post pain: adequate analgesia    Post assessment: no apparent anesthetic complications and tolerated procedure well    Post vital signs: stable    Level of consciousness: awake, alert and oriented    Nausea/Vomiting: no nausea/vomiting    Complications: none    Transfer of care protocol was followed      Last vitals:   Visit Vitals  BP (!) 155/71   Pulse 62   Temp 36.8 °C (98.2 °F) (Oral)   Resp 13   Ht 5' 7" (1.702 m)   Wt 91.3 kg (201 lb 4.5 oz)   SpO2 99%   BMI 31.52 kg/m²     "

## 2019-08-19 NOTE — ANESTHESIA POSTPROCEDURE EVALUATION
Anesthesia Post Evaluation    Patient: Jovan Davila    Procedure(s) Performed: Procedure(s) (LRB):  ENTEROSCOPY, DOUBLE BALLOON, ANTEGRADE (N/A)    Final Anesthesia Type: general  Patient location during evaluation: PACU  Patient participation: Yes- Able to Participate  Level of consciousness: awake and alert and oriented  Post-procedure vital signs: reviewed and stable  Pain management: adequate  Airway patency: patent  PONV status at discharge: No PONV  Anesthetic complications: no      Cardiovascular status: blood pressure returned to baseline, hemodynamically stable and stable  Respiratory status: unassisted, room air and spontaneous ventilation  Hydration status: euvolemic  Follow-up not needed.          Vitals Value Taken Time   /64 8/19/2019  5:47 PM   Temp 36.6 °C (97.8 °F) 8/19/2019  5:15 PM   Pulse 75 8/19/2019  5:47 PM   Resp 18 8/19/2019  5:47 PM   SpO2 97 % 8/19/2019  5:47 PM   Vitals shown include unvalidated device data.      Event Time     Out of Recovery 17:49:00          Pain/Beverley Score: Beverley Score: 10 (8/19/2019  5:45 PM)

## 2019-08-19 NOTE — PLAN OF CARE
Problem: Adult Inpatient Plan of Care  Goal: Plan of Care Review  Outcome: Ongoing (interventions implemented as appropriate)  Vital signs stable. Afebrile. Alert, oriented and following commands. Pain controlled with PRN meds. Denies pain/nausea. Tolerating ice chips and sips of water per Dr Rivas. Pt to be transferred to [facility] on tele.

## 2019-08-20 LAB
ALBUMIN SERPL BCP-MCNC: 2.6 G/DL (ref 3.5–5.2)
ALP SERPL-CCNC: 40 U/L (ref 55–135)
ALT SERPL W/O P-5'-P-CCNC: 13 U/L (ref 10–44)
ANION GAP SERPL CALC-SCNC: 9 MMOL/L (ref 8–16)
AST SERPL-CCNC: 15 U/L (ref 10–40)
BASOPHILS # BLD AUTO: 0.01 K/UL (ref 0–0.2)
BASOPHILS # BLD AUTO: 0.03 K/UL (ref 0–0.2)
BASOPHILS NFR BLD: 0.1 % (ref 0–1.9)
BASOPHILS NFR BLD: 0.3 % (ref 0–1.9)
BILIRUB SERPL-MCNC: 0.7 MG/DL (ref 0.1–1)
BUN SERPL-MCNC: 11 MG/DL (ref 8–23)
CALCIUM SERPL-MCNC: 8.8 MG/DL (ref 8.7–10.5)
CHLORIDE SERPL-SCNC: 108 MMOL/L (ref 95–110)
CO2 SERPL-SCNC: 24 MMOL/L (ref 23–29)
CREAT SERPL-MCNC: 0.8 MG/DL (ref 0.5–1.4)
DIFFERENTIAL METHOD: ABNORMAL
DIFFERENTIAL METHOD: ABNORMAL
EOSINOPHIL # BLD AUTO: 0 K/UL (ref 0–0.5)
EOSINOPHIL # BLD AUTO: 0.1 K/UL (ref 0–0.5)
EOSINOPHIL NFR BLD: 0 % (ref 0–8)
EOSINOPHIL NFR BLD: 1.4 % (ref 0–8)
ERYTHROCYTE [DISTWIDTH] IN BLOOD BY AUTOMATED COUNT: 14.3 % (ref 11.5–14.5)
ERYTHROCYTE [DISTWIDTH] IN BLOOD BY AUTOMATED COUNT: 14.3 % (ref 11.5–14.5)
EST. GFR  (AFRICAN AMERICAN): >60 ML/MIN/1.73 M^2
EST. GFR  (NON AFRICAN AMERICAN): >60 ML/MIN/1.73 M^2
GLUCOSE SERPL-MCNC: 112 MG/DL (ref 70–110)
HCT VFR BLD AUTO: 23.9 % (ref 40–54)
HCT VFR BLD AUTO: 25.9 % (ref 40–54)
HGB BLD-MCNC: 8.2 G/DL (ref 14–18)
HGB BLD-MCNC: 8.4 G/DL (ref 14–18)
IMM GRANULOCYTES # BLD AUTO: 0.01 K/UL (ref 0–0.04)
IMM GRANULOCYTES # BLD AUTO: 0.03 K/UL (ref 0–0.04)
IMM GRANULOCYTES NFR BLD AUTO: 0.1 % (ref 0–0.5)
IMM GRANULOCYTES NFR BLD AUTO: 0.3 % (ref 0–0.5)
LYMPHOCYTES # BLD AUTO: 0.8 K/UL (ref 1–4.8)
LYMPHOCYTES # BLD AUTO: 2.2 K/UL (ref 1–4.8)
LYMPHOCYTES NFR BLD: 11.5 % (ref 18–48)
LYMPHOCYTES NFR BLD: 23.4 % (ref 18–48)
MAGNESIUM SERPL-MCNC: 1.9 MG/DL (ref 1.6–2.6)
MCH RBC QN AUTO: 29 PG (ref 27–31)
MCH RBC QN AUTO: 29.5 PG (ref 27–31)
MCHC RBC AUTO-ENTMCNC: 32.4 G/DL (ref 32–36)
MCHC RBC AUTO-ENTMCNC: 34.3 G/DL (ref 32–36)
MCV RBC AUTO: 86 FL (ref 82–98)
MCV RBC AUTO: 89 FL (ref 82–98)
MONOCYTES # BLD AUTO: 0.6 K/UL (ref 0.3–1)
MONOCYTES # BLD AUTO: 0.9 K/UL (ref 0.3–1)
MONOCYTES NFR BLD: 7.9 % (ref 4–15)
MONOCYTES NFR BLD: 9.6 % (ref 4–15)
NEUTROPHILS # BLD AUTO: 5.9 K/UL (ref 1.8–7.7)
NEUTROPHILS # BLD AUTO: 6 K/UL (ref 1.8–7.7)
NEUTROPHILS NFR BLD: 65 % (ref 38–73)
NEUTROPHILS NFR BLD: 80.4 % (ref 38–73)
NRBC BLD-RTO: 0 /100 WBC
NRBC BLD-RTO: 0 /100 WBC
PHOSPHATE SERPL-MCNC: 4.3 MG/DL (ref 2.7–4.5)
PLATELET # BLD AUTO: 139 K/UL (ref 150–350)
PLATELET # BLD AUTO: 152 K/UL (ref 150–350)
PMV BLD AUTO: 11.5 FL (ref 9.2–12.9)
PMV BLD AUTO: 11.7 FL (ref 9.2–12.9)
POCT GLUCOSE: 75 MG/DL (ref 70–110)
POTASSIUM SERPL-SCNC: 4.3 MMOL/L (ref 3.5–5.1)
PROT SERPL-MCNC: 5 G/DL (ref 6–8.4)
RBC # BLD AUTO: 2.78 M/UL (ref 4.6–6.2)
RBC # BLD AUTO: 2.9 M/UL (ref 4.6–6.2)
SODIUM SERPL-SCNC: 141 MMOL/L (ref 136–145)
WBC # BLD AUTO: 7.33 K/UL (ref 3.9–12.7)
WBC # BLD AUTO: 9.19 K/UL (ref 3.9–12.7)

## 2019-08-20 PROCEDURE — 94761 N-INVAS EAR/PLS OXIMETRY MLT: CPT

## 2019-08-20 PROCEDURE — 99232 SBSQ HOSP IP/OBS MODERATE 35: CPT | Mod: ,,, | Performed by: HOSPITALIST

## 2019-08-20 PROCEDURE — 85025 COMPLETE CBC W/AUTO DIFF WBC: CPT | Mod: 91

## 2019-08-20 PROCEDURE — 25000003 PHARM REV CODE 250: Performed by: HOSPITALIST

## 2019-08-20 PROCEDURE — 20600001 HC STEP DOWN PRIVATE ROOM

## 2019-08-20 PROCEDURE — 99232 PR SUBSEQUENT HOSPITAL CARE,LEVL II: ICD-10-PCS | Mod: ,,, | Performed by: HOSPITALIST

## 2019-08-20 PROCEDURE — 63600175 PHARM REV CODE 636 W HCPCS: Performed by: STUDENT IN AN ORGANIZED HEALTH CARE EDUCATION/TRAINING PROGRAM

## 2019-08-20 PROCEDURE — 84100 ASSAY OF PHOSPHORUS: CPT

## 2019-08-20 PROCEDURE — 36415 COLL VENOUS BLD VENIPUNCTURE: CPT

## 2019-08-20 PROCEDURE — C9113 INJ PANTOPRAZOLE SODIUM, VIA: HCPCS | Performed by: STUDENT IN AN ORGANIZED HEALTH CARE EDUCATION/TRAINING PROGRAM

## 2019-08-20 PROCEDURE — 97165 OT EVAL LOW COMPLEX 30 MIN: CPT

## 2019-08-20 PROCEDURE — 83735 ASSAY OF MAGNESIUM: CPT

## 2019-08-20 PROCEDURE — 80053 COMPREHEN METABOLIC PANEL: CPT

## 2019-08-20 RX ORDER — FINASTERIDE 5 MG/1
5 TABLET, FILM COATED ORAL DAILY
Status: DISCONTINUED | OUTPATIENT
Start: 2019-08-21 | End: 2019-08-23 | Stop reason: HOSPADM

## 2019-08-20 RX ORDER — TAMSULOSIN HYDROCHLORIDE 0.4 MG/1
0.4 CAPSULE ORAL DAILY
Status: DISCONTINUED | OUTPATIENT
Start: 2019-08-21 | End: 2019-08-23 | Stop reason: HOSPADM

## 2019-08-20 RX ORDER — ATORVASTATIN CALCIUM 20 MG/1
40 TABLET, FILM COATED ORAL DAILY
Status: DISCONTINUED | OUTPATIENT
Start: 2019-08-21 | End: 2019-08-23 | Stop reason: HOSPADM

## 2019-08-20 RX ORDER — RAMELTEON 8 MG/1
8 TABLET ORAL NIGHTLY
Status: DISCONTINUED | OUTPATIENT
Start: 2019-08-20 | End: 2019-08-23 | Stop reason: HOSPADM

## 2019-08-20 RX ORDER — CLOPIDOGREL BISULFATE 75 MG/1
75 TABLET ORAL DAILY
Status: DISCONTINUED | OUTPATIENT
Start: 2019-08-20 | End: 2019-08-21

## 2019-08-20 RX ORDER — NAPROXEN SODIUM 220 MG/1
81 TABLET, FILM COATED ORAL DAILY
Status: DISCONTINUED | OUTPATIENT
Start: 2019-08-21 | End: 2019-08-21

## 2019-08-20 RX ORDER — CITALOPRAM 20 MG/1
20 TABLET, FILM COATED ORAL DAILY
Status: DISCONTINUED | OUTPATIENT
Start: 2019-08-21 | End: 2019-08-23 | Stop reason: HOSPADM

## 2019-08-20 RX ADMIN — CLOPIDOGREL 75 MG: 75 TABLET, FILM COATED ORAL at 06:08

## 2019-08-20 RX ADMIN — PANTOPRAZOLE SODIUM 40 MG: 40 INJECTION, POWDER, LYOPHILIZED, FOR SOLUTION INTRAVENOUS at 09:08

## 2019-08-20 RX ADMIN — RAMELTEON 8 MG: 8 TABLET ORAL at 09:08

## 2019-08-20 NOTE — PLAN OF CARE
Problem: Adult Inpatient Plan of Care  Goal: Plan of Care Review  Outcome: Ongoing (interventions implemented as appropriate)  Plan of care reviewed with pt. Pt remained free of falls, trauma, and injury. VSS. Pt has not has nay bloody stools today. H/H: 8.2/ 23.9. Pt restarted on plavix today.Will continue to monitor.

## 2019-08-20 NOTE — PLAN OF CARE
Problem: Skin Injury Risk Increased  Goal: Skin Health and Integrity  Outcome: Ongoing (interventions implemented as appropriate)  Intervention: Optimize Skin Protection     08/20/19 0246   Prevent Additional Skin Injury   Head of Bed (HOB) HOB at 30-45 degrees   Pressure Reduction Techniques frequent weight shift encouraged;heels elevated off bed;pressure points protected

## 2019-08-20 NOTE — PLAN OF CARE
Hospital Medicine ICU Acceptance Note    Date of Admit: 8/17/2019  Date of Transfer / Stepdown: 8/19/2019  ICU team stepping patient down: MICU,    ICU team member giving verbal handoff: Senait Barkley - Dell  Accepting  team: CORRINE    Brief History of Present Illness:   Mr. Davila is a 74 y.o. who is a transfer from [facility] for [facility]. Patien has a PMH of CAD s/p CABG ( on ASA, plavix), chronic hip pain ( started mobic three weeks ago ) presented two days ago to [facility] for syncope and bleeding , he had BRBPR and underwent a colonoscopy that revealed bleeding internal hemorrhoids. Was told that he likely needed a capsule enteroscopy was discharged with follow up ( hgb at the time 9) . Today he presented to [place] for recurrent rectal bleed and intermittent dizziness.          He was found to be  hypotensive 90/52 which improved with lying flat and IV fluid bolus. CT abdomen pelvis with contrast with no source of bleed but shows diverticulosis. Was given 4 units of blood and 2oocc of fluid before transfer      Hgb on arrival was 10, last BM was before arrival        Hospital/ICU Course:     Patient admitted from OSH, was transferred for GI and push enterosocpy, he was stable and not requiring pressors on admission and will go for enteroscopy this evening.  F/u GI recs.     CAD, Hx of CABG  --Holding ASA/plavix for active bleed. Will need to be re-started when appropriate.      HTN  --Restart medications as appropriate    Consultants and Procedures:     Consultants:  GI    Procedures:    EGD with push enteroscopy    Transfer Information:     Diet:  Clear Liquid - Advance as tolerated    Physical Activity:  Orders pending    To Do / Pending Studies / Follow ups:  -F/u GI recs  -f/u Hgb   -restart meds as approrpriate    Patient has been accepted by Hospital Medicine Team [email or web address], who will assume care of the patient upon arrival to the floor from the ICU. Please contact ICU team with any concerns  prior to arrival. Please contact Hospital Medicine at [phone] or [phone] (please do NOT leave a voicemail) when patient arrives to the floor.        Miguel Angel Ryder M.D.  Attending Physician  [facility] Medicine Dept.  Pager: [phone]  Spectralink -[phone]

## 2019-08-20 NOTE — SUBJECTIVE & OBJECTIVE
Interval History: NAEON. Seen and examined at bedside. No change in clinical picture. No reports of overt bleeding. Denies SOB, chest pain, dizziness, abdo pain. S/p enteroscopy w/o identification of bleed.     Review of Systems   Constitutional: Negative for appetite change and unexpected weight change.   HENT: Negative for sneezing and voice change.    Eyes: Negative for discharge and itching.   Respiratory: Negative for choking, chest tightness and shortness of breath.    Cardiovascular: Negative for chest pain and leg swelling.   Gastrointestinal: Positive for blood in stool. Negative for abdominal pain, nausea and rectal pain.   Endocrine: Negative for cold intolerance and heat intolerance.   Genitourinary: Negative for frequency and urgency.   Musculoskeletal: Negative for arthralgias and neck stiffness.   Skin: Negative for pallor and rash.   Neurological: Negative for tremors and light-headedness.   Hematological: Does not bruise/bleed easily.   Psychiatric/Behavioral: Negative for decreased concentration and dysphoric mood.     Objective:     Vital Signs (Most Recent):  Temp: 96.7 °F (35.9 °C) (08/20/19 1529)  Pulse: 65 (08/20/19 1529)  Resp: 16 (08/20/19 1529)  BP: (!) 126/58 (08/20/19 1529)  SpO2: 99 % (08/20/19 1529) Vital Signs (24h Range):  Temp:  [96.4 °F (35.8 °C)-98.5 °F (36.9 °C)] 96.7 °F (35.9 °C)  Pulse:  [57-96] 65  Resp:  [14-20] 16  SpO2:  [95 %-100 %] 99 %  BP: (107-167)/(58-84) 126/58     Weight: 87.2 kg (192 lb 5.6 oz)  Body mass index is 30.13 kg/m².    Intake/Output Summary (Last 24 hours) at 8/20/2019 1552  Last data filed at 8/20/2019 1200  Gross per 24 hour   Intake 1340 ml   Output 1800 ml   Net -460 ml      Physical Exam   Constitutional: He is oriented to person, place, and time. He appears well-developed and well-nourished. Nasal cannula in place.   HENT:   Head: Normocephalic and atraumatic.   Eyes: Pupils are equal, round, and reactive to light. EOM are normal.   Neck: No  tracheal deviation present. No thyromegaly present.   Cardiovascular: Regular rhythm. Bradycardia present. Exam reveals no gallop and no friction rub.   No murmur heard.  Pulmonary/Chest: Effort normal and breath sounds normal. No stridor. He has no decreased breath sounds. He has no wheezes. He has no rhonchi. He has no rales.   Abdominal: Soft. Bowel sounds are normal. There is no tenderness.   Musculoskeletal: Normal range of motion.   Neurological: He is alert and oriented to person, place, and time.   Skin: Skin is warm and dry.   Psychiatric: He has a normal mood and affect. His speech is normal and behavior is normal.       MELD-Na score: 7 at 8/19/2019  2:38 AM  MELD score: 7 at 8/19/2019  2:38 AM  Calculated from:  Serum Creatinine: 0.7 mg/dL (Rounded to 1 mg/dL) at 8/19/2019  2:38 AM  Serum Sodium: 140 mmol/L (Rounded to 137 mmol/L) at 8/19/2019  2:38 AM  Total Bilirubin: 0.9 mg/dL (Rounded to 1 mg/dL) at 8/19/2019  2:38 AM  INR(ratio): 1.1 at 8/17/2019 11:02 PM  Age: 74 years    Significant Labs:  CBC:  Recent Labs   Lab 08/19/19  1306 08/19/19  1818 08/20/19  0257   WBC 6.38 10.34 7.33   HGB 8.5* 8.9* 8.4*   HCT 25.5* 26.7* 25.9*   * 132* 139*     CMP:  Recent Labs   Lab 08/19/19  0238 08/20/19  0257    141   K 4.2 4.3   * 108   CO2 24 24   GLU 91 112*   BUN 11 11   CREATININE 0.7 0.8   CALCIUM 8.2* 8.8   PROT 4.3* 5.0*   ALBUMIN 2.4* 2.6*   BILITOT 0.9 0.7   ALKPHOS 35* 40*   AST 14 15   ALT 13 13   ANIONGAP 5* 9   EGFRNONAA >60.0 >60.0       EGD, CScope 8/19

## 2019-08-20 NOTE — HPI
Mr. Davila is a 74 y.o. who is a transfer from [facility] for [facility]. Pativictorino has a PMH of CAD s/p CABG ( on ASA, plavix), chronic hip pain ( started mobic three weeks ago ) presented two days ago to [facility] for syncope and bleeding , he had BRBPR and underwent a colonoscopy that revealed bleeding internal hemorrhoids. Was told that he likely needed a capsule enteroscopy was discharged with follow up ( hgb at the time 9) . Today he presented to [place] for recurrent rectal bleed and intermittent dizziness.       He was found to be  hypotensive 90/52 which improved with lying flat and IV fluid bolus. CT abdomen pelvis with contrast with no source of bleed but shows diverticulosis. Was given 4 units of blood and 2oocc of fluid before transfer.     Hgb on arrival was 10, last BM was before arrival .

## 2019-08-20 NOTE — ASSESSMENT & PLAN NOTE
Jovan Davila is a 74 y.o. who is on ASA and plavix and recently three weeks ago started daily mobic who presented to the OSH for dizziness and BRBPR. Patient with recent EGD (although no access to documentation), colonoscopy (with internal hemorrhoids) with no active source of bleed, transferred for capsule enteroscopy vs IR embolization. CT abdomen in OSH with diverticulosis. Intravascular resuscitation/support with IVFs and pRBCs received 4 units before transfer. Underwent double enteroscopy 8/19 without elucidation of source. GI recs as follows:   --Continue PPI 40 mg IV BID  --CBC q12  --can restart plavix and ASA   --has 2 18 gauges   --plan for VCE should bleeding recur.

## 2019-08-20 NOTE — NURSING
Patient rounds made more than every hour.  AAO x 4.  Vital signs stable.  No complaints of chest pain or shortness of breath.  Voiding without difficulty. No bowel movements this shift.  Patient is concerned about the medical problems he is having and being able to take care of his wife.  Discussed the importance of compliance with doctor orders, taking care of himself, and then he will be able to care for his wife.  Remained free from falls or incidents this shift.

## 2019-08-20 NOTE — TREATMENT PLAN
Gastroenterology Treatment Plan    Interval History  Double balloon enteroscopy yesterday - normal esophagus, stomach, duodenum, jujenum and examined part of ileum. No active bleeding.    Plan  - ok to resume Plavix  - Hgb stable 8s, continue to monitor  - if evidence of recurrent bleeding, will consider video capsule    Thank you for involving us in the care of Jovan Davila. Please call with any additional questions, concerns or changes in the patient's clinical status.      Barron Bhat MD  Gastroenterology Fellow, PGY4  [facility]

## 2019-08-20 NOTE — PROGRESS NOTES
[facility] Hospital Medicine  Progress Note    Patient Name: Jovan Davila  MRN: [id number]  Patient Class: IP- Inpatient   Admission Date: 8/17/2019  Length of Stay: 3 days  Attending Physician: Lacie Burgos*  Primary Care Provider: Jewel العلي MD    [facility] Medicine Team: [facility] [place] Lacie Burgos MD    Subjective:     Principal Problem:GI bleed        HPI:  Mr. Davila is a 74 y.o. who is a transfer from [facility] for GIB. Patien has a PMH of CAD s/p CABG ( on ASA, plavix), chronic hip pain ( started mobic three weeks ago ) presented two days ago to [facility] for syncope and bleeding , he had BRBPR and underwent a colonoscopy that revealed bleeding internal hemorrhoids. Was told that he likely needed a capsule enteroscopy was discharged with follow up ( hgb at the time 9) . Today he presented to [place] for recurrent rectal bleed and intermittent dizziness.          He was found to be  hypotensive 90/52 which improved with lying flat and IV fluid bolus. CT abdomen pelvis with contrast with no source of bleed but shows diverticulosis. Was given 4 units of blood and 2oocc of fluid before transfer      Hgb on arrival was 10, last BM was before arrival             Overview/Hospital Course:  Admitted to the ICU for active GI bleed, on arrival patient HDS with MAP > 70 Hgb 10 . GI consulted with plans for antegrade double balloon enteroscopy 8/19. One blood bowel movement since admission. No transfusions here. Patient stable for step down to hospital medicine.      Interval History: NAEON. Seen and examined at bedside. No change in clinical picture. No reports of overt bleeding. Denies SOB, chest pain, dizziness, abdo pain. S/p enteroscopy w/o identification of bleed.     Review of Systems   Constitutional: Negative for appetite change and unexpected weight change.   HENT: Negative for sneezing and voice change.    Eyes: Negative for discharge and itching.    Respiratory: Negative for choking, chest tightness and shortness of breath.    Cardiovascular: Negative for chest pain and leg swelling.   Gastrointestinal: Positive for blood in stool. Negative for abdominal pain, nausea and rectal pain.   Endocrine: Negative for cold intolerance and heat intolerance.   Genitourinary: Negative for frequency and urgency.   Musculoskeletal: Negative for arthralgias and neck stiffness.   Skin: Negative for pallor and rash.   Neurological: Negative for tremors and light-headedness.   Hematological: Does not bruise/bleed easily.   Psychiatric/Behavioral: Negative for decreased concentration and dysphoric mood.     Objective:     Vital Signs (Most Recent):  Temp: 96.7 °F (35.9 °C) (08/20/19 1529)  Pulse: 65 (08/20/19 1529)  Resp: 16 (08/20/19 1529)  BP: (!) 126/58 (08/20/19 1529)  SpO2: 99 % (08/20/19 1529) Vital Signs (24h Range):  Temp:  [96.4 °F (35.8 °C)-98.5 °F (36.9 °C)] 96.7 °F (35.9 °C)  Pulse:  [57-96] 65  Resp:  [14-20] 16  SpO2:  [95 %-100 %] 99 %  BP: (107-167)/(58-84) 126/58     Weight: 87.2 kg (192 lb 5.6 oz)  Body mass index is 30.13 kg/m².    Intake/Output Summary (Last 24 hours) at 8/20/2019 1552  Last data filed at 8/20/2019 1200  Gross per 24 hour   Intake 1340 ml   Output 1800 ml   Net -460 ml      Physical Exam   Constitutional: He is oriented to person, place, and time. He appears well-developed and well-nourished. Nasal cannula in place.   HENT:   Head: Normocephalic and atraumatic.   Eyes: Pupils are equal, round, and reactive to light. EOM are normal.   Neck: No tracheal deviation present. No thyromegaly present.   Cardiovascular: Regular rhythm. Bradycardia present. Exam reveals no gallop and no friction rub.   No murmur heard.  Pulmonary/Chest: Effort normal and breath sounds normal. No stridor. He has no decreased breath sounds. He has no wheezes. He has no rhonchi. He has no rales.   Abdominal: Soft. Bowel sounds are normal. There is no tenderness.    Musculoskeletal: Normal range of motion.   Neurological: He is alert and oriented to person, place, and time.   Skin: Skin is warm and dry.   Psychiatric: He has a normal mood and affect. His speech is normal and behavior is normal.       MELD-Na score: 7 at 8/19/2019  2:38 AM  MELD score: 7 at 8/19/2019  2:38 AM  Calculated from:  Serum Creatinine: 0.7 mg/dL (Rounded to 1 mg/dL) at 8/19/2019  2:38 AM  Serum Sodium: 140 mmol/L (Rounded to 137 mmol/L) at 8/19/2019  2:38 AM  Total Bilirubin: 0.9 mg/dL (Rounded to 1 mg/dL) at 8/19/2019  2:38 AM  INR(ratio): 1.1 at 8/17/2019 11:02 PM  Age: 74 years    Significant Labs:  CBC:  Recent Labs   Lab 08/19/19  1306 08/19/19  1818 08/20/19  0257   WBC 6.38 10.34 7.33   HGB 8.5* 8.9* 8.4*   HCT 25.5* 26.7* 25.9*   * 132* 139*     CMP:  Recent Labs   Lab 08/19/19  0238 08/20/19  0257    141   K 4.2 4.3   * 108   CO2 24 24   GLU 91 112*   BUN 11 11   CREATININE 0.7 0.8   CALCIUM 8.2* 8.8   PROT 4.3* 5.0*   ALBUMIN 2.4* 2.6*   BILITOT 0.9 0.7   ALKPHOS 35* 40*   AST 14 15   ALT 13 13   ANIONGAP 5* 9   EGFRNONAA >60.0 >60.0       EGD, CScope 8/19      Assessment/Plan:      * GI bleed  Jovan Davila is a 74 y.o. who is on ASA and plavix and recently three weeks ago started daily mobic who presented to the OSH for dizziness and BRBPR. Patient with recent EGD (although no access to documentation), colonoscopy (with internal hemorrhoids) with no active source of bleed, transferred for capsule enteroscopy vs IR embolization. CT abdomen in OSH with diverticulosis. Intravascular resuscitation/support with IVFs and pRBCs received 4 units before transfer. Underwent double enteroscopy 8/19 without elucidation of source. GI recs as follows:   --Continue PPI 40 mg IV BID  --CBC q12  --can restart plavix and ASA   --has 2 18 gauges   --plan for VCE should bleeding recur.    Hypertension  All BP meds held on admission due to concern for acute bleed. Currently normotensive.    Upon med reconciliation, patient stated he'd been taken off of all BP meds, including Imdur, metoprolol, lisinopril. Will refer back to PCP/cardiologist.     Coronary artery disease  Hx of CABG  Initially, DAPT held due to concern for bleeding. Following EGD, resumed 8/20.      VTE Risk Mitigation (From admission, onward)        Ordered     IP VTE HIGH RISK PATIENT  Once      08/17/19 2316     Reason for No Pharmacological VTE Prophylaxis  Once      08/17/19 2316            Lacie Burgos MD  Department of Hospital Medicine   [facility]

## 2019-08-20 NOTE — PLAN OF CARE
Problem: Occupational Therapy Goal  Goal: Occupational Therapy Goal  Outcome: Outcome(s) achieved Date Met: 08/20/19  Intial landonal completed   No goals established; pt at baseline with mobility and ADLs.   Pt d/c from acute OT 8/20/19    Radha Beaver OTR/L  Pager: [phone]  8/20/2019

## 2019-08-20 NOTE — HOSPITAL COURSE
Admitted to the ICU for active GI bleed, on arrival patient HDS with MAP > 70 Hgb 10 . GI consulted with plans for antegrade double balloon enteroscopy 8/19. One blood bowel movement since admission. No transfusions here. Patient stable for step down to hospital medicine.     Stepped down to [facility] Med A. Underwent double balloon enteroscopy without identification of bleed. Hg trended down on 8/21-8/22 necessitating additional unit of rbc transfused. Underwent VCE on 8/22 which did not identify bleed. Gastroenterology felt that bleed may have been due to internal hemorrhoids. His hemoglobin remained stable following his transfusion and he did not require further blood products. He was subsequently discharged with instructions to resume his DAPT and f/u with his cardiologist and PCP w/i 1-2 weeks for surveillance.     For details on mgmt of IP problems, please see below:   GI bleed  Jovan Davila is a 74 y.o. who is on ASA and plavix and recently three weeks ago started daily mobic who presented to the OSH for dizziness and BRBPR. Patient with recent EGD (although no access to documentation), colonoscopy (with internal hemorrhoids) with no active source of bleed, transferred for capsule enteroscopy vs IR embolization. CT abdomen in OSH with diverticulosis. Intravascular resuscitation/support with IVFs and pRBCs received 4 units before transfer. 1 additional unit thereafter. Underwent double enteroscopy 8/19 without elucidation of source. GI recs as follows:   --Continue PPI 40 mg IV BID  --CBC q12  --can restart plavix and ASA   --has 2 18 gauges   --plan for VCE should bleeding recur.  --Hg trending down 8/21, 8.2-->7.9-->7.3. Patient hypotensive and reporting dizziness. 1u pRBC ordered, 1L bolus ordered. GI alerted.   Transfused 1 u pRBC 8/21 with appropriate response in blood counts. VCE initiated morning of 8/22; no identification of active bleeding or suspect lesions.   Discharged with instructions to follow up  with PCP for surveillance 8/23. Instructed to return to nearest ED should he experience signs/symptoms of recurrent bleeding, including bloody stool, BRBPR, syncope, dizziness/lightheadedness, fatigue, SOB, chest pain or palpitations.      Hypertension  All BP meds held on admission due to concern for acute bleed. Currently normotensive.   Upon med reconciliation, patient stated he'd been taken off of all BP meds, including Imdur, metoprolol, lisinopril. Will refer back to PCP/cardiologist. Normotensive at discharge.     Coronary artery disease  Hx of CABG  Initially, DAPT held due to concern for bleeding. Following EGD, resumed 8/20.  Advised patient to follow up with his cardiologist regarding stopping his plavix. His last stent was years ago, however, given his multiple stents and multiple bypass surgeries, he is reticent about stopping his plavix without the recommendation of his cardiologist.

## 2019-08-20 NOTE — ASSESSMENT & PLAN NOTE
All BP meds held on admission due to concern for acute bleed. Currently normotensive.   Upon med reconciliation, patient stated he'd been taken off of all BP meds, including Imdur, metoprolol, lisinopril. Will refer back to PCP/cardiologist.

## 2019-08-20 NOTE — PT/OT/SLP EVAL
"Occupational Therapy   Evaluation and Discharge Note    Name: Jovan Davila  MRN: [id number]  Admitting Diagnosis:  GI bleed 1 Day Post-Op    Recommendations:     Discharge Recommendations: home  Discharge Equipment Recommendations:  none  Barriers to discharge:  None    Assessment:     Jovan Davila is a 74 y.o. male with a medical diagnosis of GI bleed. At this time, patient is functioning at their prior level of function and does not require further acute OT services.     Plan:     During this hospitalization, patient does not require further acute OT services.  Please re-consult if situation changes.    · Plan of Care Reviewed with: patient    Subjective     Chief Complaint: bleeding   Patient/Family Comments/goals: to return home soon     Occupational Profile:  Living Environment: Pt lives with his wife (whom is bedridden), son, daughter, and granddaughter in a 2 SH with 3 ELIZABETH, no HR; bed/bathroom on 2nd floor with 13 steps and R HR present. Pt is retired and driving. Pt performs all household duties. Pt reports no recent falls in the last 6 months.   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     Pain/Comfort:  · Pain Rating 1: 0/10  · Pain Rating Post-Intervention 1: 0/10    Patients cultural, spiritual, Methodist conflicts given the current situation: no    Objective:     Communicated with: RN prior to session.  Patient found supine with telemetry upon OT entry to room. Pt agreeable to therapy session.     General Precautions: Standard, (none )   Orthopedic Precautions:N/A   Braces: N/A     Occupational Performance:    Bed Mobility:    · Patient completed Rolling/Turning to Right with independence  · Patient completed Scooting/Bridging with independence  · Patient " completed Supine to Sit with independence    Functional Mobility/Transfers:  · Patient completed Sit <> Stand Transfer with independence  with  no assistive device   · Functional Mobility: Pt engaging in functional mobility to simulate household/community distances approx 300ft  with independence using no AD in order to maximize functional activity tolerance and standing balance required for engagement in occupations of choice.   · No LOB, SOB, or dizziness reported     Activities of Daily Living:  · Upper Body Dressing: set-up A  pt donned gown like robe while in standing   · Lower Body Dressing: independence pt able to pull up b/l sock in figure 4 position while sitting EOB     Cognitive/Visual Perceptual:  Cognitive/Psychosocial Skills:     -       Oriented to: Person, Place, Time and Situation   -       Follows Commands/attention:Follows multistep  commands  -       Communication: clear/fluent  -       Memory: No Deficits noted  -       Safety awareness/insight to disability: intact   -       Mood/Affect/Coping skills/emotional control: Appropriate to situation  Visual/Perceptual:      -Intact      Physical Exam:  Balance:    - Static sit: (I)  -  Dynamic sit: (I)  - Static standing: (I)  - Dynamic Standing: (I)    Postural examination/scapula alignment:    -       No postural abnormalities identified  Skin integrity: Visible skin intact  Edema:  None noted  Sensation:    -       Intact  Dominant hand:    -       right  Upper Extremity Range of Motion:     -       Right Upper Extremity: WFL  -       Left Upper Extremity: WFL  Upper Extremity Strength:    -       Right Upper Extremity: WFL  -       Left Upper Extremity: WFL   Strength:    -       Right Upper Extremity: WFL  -       Left Upper Extremity: WFL    AMPAC 6 Click ADL:  AMPAC Total Score: 24    Treatment & Education:  - Pt educated on role of OT, POC   - Pt instructed to ambulate hallway daily in order to maintain functional strength and endurance  during hospitalization.   - Time provided for therapeutic counseling and discussion of health disposition.   - Importance of OOB ax's with staff member assistance and sitting OOB majority of day.   - Pt completed ADLs and functional mobility for treatment session as noted above   - Pt verbalized understanding. Pt expressed no further concerns/questions.  - whiteboard updated   Education:    Patient left sitting EOB  with all lines intact and call button in reach    GOALS:   Multidisciplinary Problems     Occupational Therapy Goals     Not on file          Multidisciplinary Problems (Resolved)        Problem: Occupational Therapy Goal    Goal Priority Disciplines Outcome Interventions   Occupational Therapy Goal   (Resolved)     OT, PT/OT Outcome(s) achieved                    History:     Past Medical History:   Diagnosis Date    Coronary artery disease     Diverticulosis     Encounter for blood transfusion     Hypertension        Past Surgical History:   Procedure Laterality Date    CARDIAC SURGERY      CABG X2    ENTEROSCOPY, DOUBLE BALLOON, ANTEGRADE N/A 8/19/2019    Performed by Nolan Rivas MD at [facility] (2ND FLR)    HERNIA REPAIR      VASCULAR SURGERY      sents X7       Time Tracking:     OT Date of Treatment: 08/20/19  OT Start Time: 1338  OT Stop Time: 1350  OT Total Time (min): 12 min    Billable Minutes:Evaluation 12     Radha Beaver OT  8/20/2019

## 2019-08-21 LAB
ABO + RH BLD: NORMAL
ALBUMIN SERPL BCP-MCNC: 2.6 G/DL (ref 3.5–5.2)
ALP SERPL-CCNC: 38 U/L (ref 55–135)
ALT SERPL W/O P-5'-P-CCNC: 10 U/L (ref 10–44)
ANION GAP SERPL CALC-SCNC: 7 MMOL/L (ref 8–16)
AST SERPL-CCNC: 14 U/L (ref 10–40)
BASOPHILS # BLD AUTO: 0.02 K/UL (ref 0–0.2)
BASOPHILS # BLD AUTO: 0.03 K/UL (ref 0–0.2)
BASOPHILS NFR BLD: 0.3 % (ref 0–1.9)
BASOPHILS NFR BLD: 0.5 % (ref 0–1.9)
BILIRUB SERPL-MCNC: 0.9 MG/DL (ref 0.1–1)
BLD GP AB SCN CELLS X3 SERPL QL: NORMAL
BLD PROD TYP BPU: NORMAL
BLOOD UNIT EXPIRATION DATE: NORMAL
BLOOD UNIT TYPE CODE: 9500
BLOOD UNIT TYPE: NORMAL
BUN SERPL-MCNC: 10 MG/DL (ref 8–23)
CALCIUM SERPL-MCNC: 8.8 MG/DL (ref 8.7–10.5)
CHLORIDE SERPL-SCNC: 110 MMOL/L (ref 95–110)
CO2 SERPL-SCNC: 25 MMOL/L (ref 23–29)
CODING SYSTEM: NORMAL
CREAT SERPL-MCNC: 0.8 MG/DL (ref 0.5–1.4)
DIFFERENTIAL METHOD: ABNORMAL
DIFFERENTIAL METHOD: ABNORMAL
DISPENSE STATUS: NORMAL
EOSINOPHIL # BLD AUTO: 0.2 K/UL (ref 0–0.5)
EOSINOPHIL # BLD AUTO: 0.3 K/UL (ref 0–0.5)
EOSINOPHIL NFR BLD: 3.6 % (ref 0–8)
EOSINOPHIL NFR BLD: 4.1 % (ref 0–8)
ERYTHROCYTE [DISTWIDTH] IN BLOOD BY AUTOMATED COUNT: 14.4 % (ref 11.5–14.5)
ERYTHROCYTE [DISTWIDTH] IN BLOOD BY AUTOMATED COUNT: 14.6 % (ref 11.5–14.5)
EST. GFR  (AFRICAN AMERICAN): >60 ML/MIN/1.73 M^2
EST. GFR  (NON AFRICAN AMERICAN): >60 ML/MIN/1.73 M^2
GLUCOSE SERPL-MCNC: 80 MG/DL (ref 70–110)
HCT VFR BLD AUTO: 22.1 % (ref 40–54)
HCT VFR BLD AUTO: 24.3 % (ref 40–54)
HGB BLD-MCNC: 7.3 G/DL (ref 14–18)
HGB BLD-MCNC: 7.9 G/DL (ref 14–18)
IMM GRANULOCYTES # BLD AUTO: 0.01 K/UL (ref 0–0.04)
IMM GRANULOCYTES # BLD AUTO: 0.02 K/UL (ref 0–0.04)
IMM GRANULOCYTES NFR BLD AUTO: 0.2 % (ref 0–0.5)
IMM GRANULOCYTES NFR BLD AUTO: 0.3 % (ref 0–0.5)
LYMPHOCYTES # BLD AUTO: 1.3 K/UL (ref 1–4.8)
LYMPHOCYTES # BLD AUTO: 2.1 K/UL (ref 1–4.8)
LYMPHOCYTES NFR BLD: 23 % (ref 18–48)
LYMPHOCYTES NFR BLD: 33.2 % (ref 18–48)
MAGNESIUM SERPL-MCNC: 1.9 MG/DL (ref 1.6–2.6)
MCH RBC QN AUTO: 29.1 PG (ref 27–31)
MCH RBC QN AUTO: 29.5 PG (ref 27–31)
MCHC RBC AUTO-ENTMCNC: 32.5 G/DL (ref 32–36)
MCHC RBC AUTO-ENTMCNC: 33 G/DL (ref 32–36)
MCV RBC AUTO: 88 FL (ref 82–98)
MCV RBC AUTO: 91 FL (ref 82–98)
MONOCYTES # BLD AUTO: 0.6 K/UL (ref 0.3–1)
MONOCYTES # BLD AUTO: 0.6 K/UL (ref 0.3–1)
MONOCYTES NFR BLD: 11.5 % (ref 4–15)
MONOCYTES NFR BLD: 9.2 % (ref 4–15)
NEUTROPHILS # BLD AUTO: 3.4 K/UL (ref 1.8–7.7)
NEUTROPHILS # BLD AUTO: 3.4 K/UL (ref 1.8–7.7)
NEUTROPHILS NFR BLD: 52.9 % (ref 38–73)
NEUTROPHILS NFR BLD: 61.2 % (ref 38–73)
NRBC BLD-RTO: 0 /100 WBC
NRBC BLD-RTO: 0 /100 WBC
PHOSPHATE SERPL-MCNC: 3.9 MG/DL (ref 2.7–4.5)
PLATELET # BLD AUTO: 123 K/UL (ref 150–350)
PLATELET # BLD AUTO: 141 K/UL (ref 150–350)
PMV BLD AUTO: 12.2 FL (ref 9.2–12.9)
PMV BLD AUTO: 12.2 FL (ref 9.2–12.9)
POTASSIUM SERPL-SCNC: 3.8 MMOL/L (ref 3.5–5.1)
PROT SERPL-MCNC: 4.7 G/DL (ref 6–8.4)
RBC # BLD AUTO: 2.51 M/UL (ref 4.6–6.2)
RBC # BLD AUTO: 2.68 M/UL (ref 4.6–6.2)
SODIUM SERPL-SCNC: 142 MMOL/L (ref 136–145)
TRANS ERYTHROCYTES VOL PATIENT: NORMAL ML
WBC # BLD AUTO: 5.56 K/UL (ref 3.9–12.7)
WBC # BLD AUTO: 6.39 K/UL (ref 3.9–12.7)

## 2019-08-21 PROCEDURE — 86920 COMPATIBILITY TEST SPIN: CPT

## 2019-08-21 PROCEDURE — C9113 INJ PANTOPRAZOLE SODIUM, VIA: HCPCS | Performed by: STUDENT IN AN ORGANIZED HEALTH CARE EDUCATION/TRAINING PROGRAM

## 2019-08-21 PROCEDURE — 85025 COMPLETE CBC W/AUTO DIFF WBC: CPT

## 2019-08-21 PROCEDURE — 80053 COMPREHEN METABOLIC PANEL: CPT

## 2019-08-21 PROCEDURE — 97161 PT EVAL LOW COMPLEX 20 MIN: CPT

## 2019-08-21 PROCEDURE — 36415 COLL VENOUS BLD VENIPUNCTURE: CPT

## 2019-08-21 PROCEDURE — 63600175 PHARM REV CODE 636 W HCPCS: Performed by: HOSPITALIST

## 2019-08-21 PROCEDURE — 99233 PR SUBSEQUENT HOSPITAL CARE,LEVL III: ICD-10-PCS | Mod: ,,, | Performed by: HOSPITALIST

## 2019-08-21 PROCEDURE — 20600001 HC STEP DOWN PRIVATE ROOM

## 2019-08-21 PROCEDURE — 63600175 PHARM REV CODE 636 W HCPCS: Performed by: STUDENT IN AN ORGANIZED HEALTH CARE EDUCATION/TRAINING PROGRAM

## 2019-08-21 PROCEDURE — 25000003 PHARM REV CODE 250: Performed by: HOSPITALIST

## 2019-08-21 PROCEDURE — 36430 TRANSFUSION BLD/BLD COMPNT: CPT

## 2019-08-21 PROCEDURE — P9021 RED BLOOD CELLS UNIT: HCPCS

## 2019-08-21 PROCEDURE — 83735 ASSAY OF MAGNESIUM: CPT

## 2019-08-21 PROCEDURE — 86901 BLOOD TYPING SEROLOGIC RH(D): CPT

## 2019-08-21 PROCEDURE — 99233 SBSQ HOSP IP/OBS HIGH 50: CPT | Mod: ,,, | Performed by: HOSPITALIST

## 2019-08-21 PROCEDURE — 84100 ASSAY OF PHOSPHORUS: CPT

## 2019-08-21 RX ORDER — HYDROCODONE BITARTRATE AND ACETAMINOPHEN 500; 5 MG/1; MG/1
TABLET ORAL
Status: DISCONTINUED | OUTPATIENT
Start: 2019-08-21 | End: 2019-08-23 | Stop reason: HOSPADM

## 2019-08-21 RX ADMIN — ASPIRIN 81 MG CHEWABLE TABLET 81 MG: 81 TABLET CHEWABLE at 08:08

## 2019-08-21 RX ADMIN — CITALOPRAM HYDROBROMIDE 20 MG: 20 TABLET ORAL at 08:08

## 2019-08-21 RX ADMIN — RAMELTEON 8 MG: 8 TABLET ORAL at 10:08

## 2019-08-21 RX ADMIN — ATORVASTATIN CALCIUM 40 MG: 20 TABLET, FILM COATED ORAL at 08:08

## 2019-08-21 RX ADMIN — TAMSULOSIN HYDROCHLORIDE 0.4 MG: 0.4 CAPSULE ORAL at 08:08

## 2019-08-21 RX ADMIN — FINASTERIDE 5 MG: 5 TABLET, FILM COATED ORAL at 08:08

## 2019-08-21 RX ADMIN — PANTOPRAZOLE SODIUM 40 MG: 40 INJECTION, POWDER, LYOPHILIZED, FOR SOLUTION INTRAVENOUS at 10:08

## 2019-08-21 RX ADMIN — CLOPIDOGREL 75 MG: 75 TABLET, FILM COATED ORAL at 08:08

## 2019-08-21 RX ADMIN — SODIUM CHLORIDE 1000 ML: 0.9 INJECTION, SOLUTION INTRAVENOUS at 05:08

## 2019-08-21 NOTE — PROGRESS NOTES
[facility] Hospital Medicine  Progress Note    Patient Name: Jovan Davila  MRN: [id number]  Patient Class: IP- Inpatient   Admission Date: 8/17/2019  Length of Stay: 4 days  Attending Physician: Lacie Burgos*  Primary Care Provider: Jewel العلي MD    [facility] Medicine Team: [facility] [place] [facility]OSP MED A Lacie Burgos MD    Subjective:     Principal Problem:GI bleed        HPI:  Mr. Davila is a 74 y.o. who is a transfer from [facility] for GIB. Patien has a PMH of CAD s/p CABG ( on ASA, plavix), chronic hip pain ( started mobic three weeks ago ) presented two days ago to [facility] for syncope and bleeding , he had BRBPR and underwent a colonoscopy that revealed bleeding internal hemorrhoids. Was told that he likely needed a capsule enteroscopy was discharged with follow up ( hgb at the time 9) . Today he presented to [place] for recurrent rectal bleed and intermittent dizziness.          He was found to be  hypotensive 90/52 which improved with lying flat and IV fluid bolus. CT abdomen pelvis with contrast with no source of bleed but shows diverticulosis. Was given 4 units of blood and 2oocc of fluid before transfer      Hgb on arrival was 10, last BM was before arrival             Overview/Hospital Course:  Admitted to the ICU for active GI bleed, on arrival patient HDS with MAP > 70 Hgb 10 . GI consulted with plans for antegrade double balloon enteroscopy 8/19. One blood bowel movement since admission. No transfusions here. Patient stable for step down to hospital medicine.      Interval History: NAEON. Seen and examined at bedside. Hg trending down unfortunately. Denies overt bleeding but reports one episode of formed, melenic stool this afternoon. Became hypotensive, systolic in 80s, with reported dizziness. 1L bolus given, and 1 u pRBC ordered for transfusion. GI alerted for consideration of VCE or further diagnostic testing as Hg has trended down again: 8.2-->7.9-->7.3. NPO  at midnight.     Review of Systems   Constitutional: Negative for appetite change and unexpected weight change.   HENT: Negative for sneezing and voice change.    Eyes: Negative for discharge and itching.   Respiratory: Negative for choking, chest tightness and shortness of breath.    Cardiovascular: Negative for chest pain and leg swelling.   Gastrointestinal: Positive for blood in stool. Negative for abdominal pain, nausea and rectal pain.   Endocrine: Negative for cold intolerance and heat intolerance.   Genitourinary: Negative for frequency and urgency.   Musculoskeletal: Negative for arthralgias and neck stiffness.   Skin: Negative for pallor and rash.   Neurological: Negative for tremors and light-headedness.   Hematological: Does not bruise/bleed easily.   Psychiatric/Behavioral: Negative for decreased concentration and dysphoric mood.     Objective:     Vital Signs (Most Recent):  Temp: 98 °F (36.7 °C) (08/21/19 1556)  Pulse: (!) 54 (08/21/19 1605)  Resp: 18 (08/21/19 1556)  BP: (!) 88/54 (08/21/19 1605)  SpO2: 96 % (08/21/19 1556) Vital Signs (24h Range):  Temp:  [96.4 °F (35.8 °C)-98.4 °F (36.9 °C)] 98 °F (36.7 °C)  Pulse:  [50-98] 54  Resp:  [16-19] 18  SpO2:  [94 %-98 %] 96 %  BP: ()/(50-67) 88/54     Weight: 87.2 kg (192 lb 5.6 oz)  Body mass index is 30.13 kg/m².    Intake/Output Summary (Last 24 hours) at 8/21/2019 1654  Last data filed at 8/21/2019 1400  Gross per 24 hour   Intake 1260 ml   Output 1251 ml   Net 9 ml      Physical Exam   Constitutional: He is oriented to person, place, and time. He appears well-developed and well-nourished. Nasal cannula in place.   HENT:   Head: Normocephalic and atraumatic.   Eyes: Pupils are equal, round, and reactive to light. EOM are normal.   Neck: No tracheal deviation present. No thyromegaly present.   Cardiovascular: Regular rhythm. Bradycardia present. Exam reveals no gallop and no friction rub.   No murmur heard.  Pulmonary/Chest: Effort normal and  breath sounds normal. No stridor. He has no decreased breath sounds. He has no wheezes. He has no rhonchi. He has no rales.   Abdominal: Soft. Bowel sounds are normal. There is no tenderness.   Musculoskeletal: Normal range of motion.   Neurological: He is alert and oriented to person, place, and time.   Skin: Skin is warm and dry.   Psychiatric: He has a normal mood and affect. His speech is normal and behavior is normal.       MELD-Na score: 7 at 8/19/2019  2:38 AM  MELD score: 7 at 8/19/2019  2:38 AM  Calculated from:  Serum Creatinine: 0.7 mg/dL (Rounded to 1 mg/dL) at 8/19/2019  2:38 AM  Serum Sodium: 140 mmol/L (Rounded to 137 mmol/L) at 8/19/2019  2:38 AM  Total Bilirubin: 0.9 mg/dL (Rounded to 1 mg/dL) at 8/19/2019  2:38 AM  INR(ratio): 1.1 at 8/17/2019 11:02 PM  Age: 74 years    Significant Labs:  CBC:  Recent Labs   Lab 08/20/19  1627 08/21/19  0305 08/21/19  1522   WBC 9.19 6.39 5.56   HGB 8.2* 7.9* 7.3*   HCT 23.9* 24.3* 22.1*    141* 123*     CMP:  Recent Labs   Lab 08/20/19  0257 08/21/19  0305    142   K 4.3 3.8    110   CO2 24 25   * 80   BUN 11 10   CREATININE 0.8 0.8   CALCIUM 8.8 8.8   PROT 5.0* 4.7*   ALBUMIN 2.6* 2.6*   BILITOT 0.7 0.9   ALKPHOS 40* 38*   AST 15 14   ALT 13 10   ANIONGAP 9 7*   EGFRNONAA >60.0 >60.0       EGD, CScope 8/19      Assessment/Plan:      * GI bleed  Jovan Davila is a 74 y.o. who is on ASA and plavix and recently three weeks ago started daily mobic who presented to the OSH for dizziness and BRBPR. Patient with recent EGD (although no access to documentation), colonoscopy (with internal hemorrhoids) with no active source of bleed, transferred for capsule enteroscopy vs IR embolization. CT abdomen in OSH with diverticulosis. Intravascular resuscitation/support with IVFs and pRBCs received 4 units before transfer. Underwent double enteroscopy 8/19 without elucidation of source. GI recs as follows:   --Continue PPI 40 mg IV BID  --CBC q12  --can  restart plavix and ASA   --has 2 18 gauges   --plan for VCE should bleeding recur.  --Hg trending down 8/21, 8.2-->7.9-->7.3. Patient hypotensive and reporting dizziness. 1u pRBC ordered, 1L bolus ordered. GI alerted.     Hypertension  All BP meds held on admission due to concern for acute bleed. Currently normotensive.   Upon med reconciliation, patient stated he'd been taken off of all BP meds, including Imdur, metoprolol, lisinopril. Will refer back to PCP/cardiologist.     Coronary artery disease  Hx of CABG  Initially, DAPT held due to concern for bleeding. Following EGD, resumed 8/20.      VTE Risk Mitigation (From admission, onward)        Ordered     IP VTE HIGH RISK PATIENT  Once      08/17/19 2316     Reason for No Pharmacological VTE Prophylaxis  Once      08/17/19 2316                Lacie Burgos MD  Department of Hospital Medicine   [facility]

## 2019-08-21 NOTE — PROGRESS NOTES
08/21/19 1605   Vital Signs   Pulse (!) 54   BP (!) 88/54   pt BP 83/50  Rechecked by nurse, 88/54. Pt stated he feels lightheaded while ambulating. MD Aubrey paged. Awaiting response. Will continue to monitor.

## 2019-08-21 NOTE — PLAN OF CARE
Problem: Physical Therapy Goal  Goal: Physical Therapy Goal  Outcome: Outcome(s) achieved Date Met: 08/21/19  PT evaluation complete. No goals established as pt is baseline with mobility and has no acute PT needs at this time. D/C from PT services.    Zaina Brown, PT, DPT   8/21/2019  [phone]

## 2019-08-21 NOTE — PT/OT/SLP EVAL
"Physical Therapy Evaluation and Discharge Note    Patient Name:  Jovan Davila   MRN:  [id number]    Recommendations:     Discharge Recommendations:  home   Discharge Equipment Recommendations: none   Barriers to discharge: None    Assessment:     Jovan Davila is a 74 y.o. male admitted with a medical diagnosis of GI bleed. Pt completing all functional mobility, including stairs, without physical assist or use of DME. Ambulated greater than household distance and ascended/descended stairs without LOB or SOB noted.  At this time, patient is functioning at their prior level of function and does not require further acute PT services.     Recent Surgery: Procedure(s) (LRB):  ENTEROSCOPY, DOUBLE BALLOON, ANTEGRADE (N/A) 2 Days Post-Op    Plan:     During this hospitalization, patient does not require further acute PT services.  Please re-consult if situation changes.      Subjective     Chief Complaint: none noted   Patient/Family Comments/goals: "I'm ready to go home and take care of my wife."  Pain/Comfort:  · Pain Rating 1: 0/10    Patients cultural, spiritual, Judaism conflicts given the current situation: no    Living Environment:  Pt lives with his wife, daughter, and granddaughter in a 2SH with 3 ELIZABETH; bedroom/bathroom on 2nd floor of home with R handrail to ascend stairs to 2nd floor; no handrail at ELIZABETH.  Prior to admission, patients level of function was independent, including driving, housework, and yardwork. Pt is primary caregiver for his wife.  Equipment used at home: none.  DME owned (not currently used): equipment owned by pt's wife.  Upon discharge, patient will have assistance from family.    Objective:     Communicated with RN prior to session.  Patient found supine with telemetry upon PT entry to room.    General Precautions: Standard,     Orthopedic Precautions:N/A   Braces: N/A     Exams:  · Cognitive Exam:  Patient is oriented to Person, Place, Time and Situation  · Sensation:    · -       " Intact  · RLE ROM: WFL  · RLE Strength: WFL  · LLE ROM: WFL  · LLE Strength: WFL    Functional Mobility:  · Bed Mobility:     · Supine to Sit: independence  · Transfers:     · Sit to Stand:  independence with no AD  · Gait: ~400 ft. with independence and no AD  · No LOB, SOB, or significant gait deviations noted   · Stairs:  Pt ascended/descended 9 stair(s) with No Assistive Device with left handrail with Supervision or Set-up Assistance.     AM-PAC 6 CLICK MOBILITY  Total Score:24       Therapeutic Activities and Exercises:   Pt educated on role of PT and PT POC, including plan to d/c IP PT services at this time. Pt instructed to contact medical team if therapy needs arise during hospital admission. Pt verbalized understanding.   Pt educated on the effects of bed rest and encouraged to sit UIC majority of day as tolerated. Pt v/u.     AM-PAC 6 CLICK MOBILITY  Total Score:24     Patient left up in chair with all lines intact and call button in reach.    GOALS:   Multidisciplinary Problems     Physical Therapy Goals     Not on file          Multidisciplinary Problems (Resolved)        Problem: Physical Therapy Goal    Goal Priority Disciplines Outcome Goal Variances Interventions   Physical Therapy Goal   (Resolved)     PT, PT/OT Outcome(s) achieved                     History:     Past Medical History:   Diagnosis Date    Coronary artery disease     Diverticulosis     Encounter for blood transfusion     Hypertension        Past Surgical History:   Procedure Laterality Date    CARDIAC SURGERY      CABG X2    ENTEROSCOPY, DOUBLE BALLOON, ANTEGRADE N/A 8/19/2019    Performed by Nolan Rivas MD at [facility] (2ND FLR)    HERNIA REPAIR      VASCULAR SURGERY      sents X7       Time Tracking:     PT Received On: 08/21/19  PT Start Time: 1020     PT Stop Time: 1032  PT Total Time (min): 12 min     Billable Minutes: Evaluation 12    Zaina Brown, PT, DPT   8/21/2019  [phone]

## 2019-08-21 NOTE — ASSESSMENT & PLAN NOTE
Jovan Davila is a 74 y.o. who is on ASA and plavix and recently three weeks ago started daily mobic who presented to the OSH for dizziness and BRBPR. Patient with recent EGD (although no access to documentation), colonoscopy (with internal hemorrhoids) with no active source of bleed, transferred for capsule enteroscopy vs IR embolization. CT abdomen in OSH with diverticulosis. Intravascular resuscitation/support with IVFs and pRBCs received 4 units before transfer. Underwent double enteroscopy 8/19 without elucidation of source. GI recs as follows:   --Continue PPI 40 mg IV BID  --CBC q12  --can restart plavix and ASA   --has 2 18 gauges   --plan for VCE should bleeding recur.  --Hg trending down 8/21, 8.2-->7.9-->7.3. Patient hypotensive and reporting dizziness. 1u pRBC ordered, 1L bolus ordered. GI alerted.

## 2019-08-21 NOTE — PLAN OF CARE
Problem: Adult Inpatient Plan of Care  Goal: Plan of Care Review  Outcome: Ongoing (interventions implemented as appropriate)  Pt free from falls and injury during shift. Pt reported 1 black formed stool. Diet advanced to cardiac diet. H/H 7.9/24.3. VSS, pt voiced no complaints. POC updated with pt, will continue to monitor.

## 2019-08-21 NOTE — SUBJECTIVE & OBJECTIVE
Interval History: NAEON. Seen and examined at bedside. Hg trending down unfortunately. Denies overt bleeding but reports one episode of formed, melenic stool this afternoon. Became hypotensive, systolic in 80s, with reported dizziness. 1L bolus given, and 1 u pRBC ordered for transfusion. GI alerted for consideration of VCE or further diagnostic testing as Hg has trended down again: 8.2-->7.9-->7.3. NPO at midnight.     Review of Systems   Constitutional: Negative for appetite change and unexpected weight change.   HENT: Negative for sneezing and voice change.    Eyes: Negative for discharge and itching.   Respiratory: Negative for choking, chest tightness and shortness of breath.    Cardiovascular: Negative for chest pain and leg swelling.   Gastrointestinal: Positive for blood in stool. Negative for abdominal pain, nausea and rectal pain.   Endocrine: Negative for cold intolerance and heat intolerance.   Genitourinary: Negative for frequency and urgency.   Musculoskeletal: Negative for arthralgias and neck stiffness.   Skin: Negative for pallor and rash.   Neurological: Negative for tremors and light-headedness.   Hematological: Does not bruise/bleed easily.   Psychiatric/Behavioral: Negative for decreased concentration and dysphoric mood.     Objective:     Vital Signs (Most Recent):  Temp: 98 °F (36.7 °C) (08/21/19 1556)  Pulse: (!) 54 (08/21/19 1605)  Resp: 18 (08/21/19 1556)  BP: (!) 88/54 (08/21/19 1605)  SpO2: 96 % (08/21/19 1556) Vital Signs (24h Range):  Temp:  [96.4 °F (35.8 °C)-98.4 °F (36.9 °C)] 98 °F (36.7 °C)  Pulse:  [50-98] 54  Resp:  [16-19] 18  SpO2:  [94 %-98 %] 96 %  BP: ()/(50-67) 88/54     Weight: 87.2 kg (192 lb 5.6 oz)  Body mass index is 30.13 kg/m².    Intake/Output Summary (Last 24 hours) at 8/21/2019 8984  Last data filed at 8/21/2019 1400  Gross per 24 hour   Intake 1260 ml   Output 1251 ml   Net 9 ml      Physical Exam   Constitutional: He is oriented to person, place, and time.  He appears well-developed and well-nourished. Nasal cannula in place.   HENT:   Head: Normocephalic and atraumatic.   Eyes: Pupils are equal, round, and reactive to light. EOM are normal.   Neck: No tracheal deviation present. No thyromegaly present.   Cardiovascular: Regular rhythm. Bradycardia present. Exam reveals no gallop and no friction rub.   No murmur heard.  Pulmonary/Chest: Effort normal and breath sounds normal. No stridor. He has no decreased breath sounds. He has no wheezes. He has no rhonchi. He has no rales.   Abdominal: Soft. Bowel sounds are normal. There is no tenderness.   Musculoskeletal: Normal range of motion.   Neurological: He is alert and oriented to person, place, and time.   Skin: Skin is warm and dry.   Psychiatric: He has a normal mood and affect. His speech is normal and behavior is normal.       MELD-Na score: 7 at 8/19/2019  2:38 AM  MELD score: 7 at 8/19/2019  2:38 AM  Calculated from:  Serum Creatinine: 0.7 mg/dL (Rounded to 1 mg/dL) at 8/19/2019  2:38 AM  Serum Sodium: 140 mmol/L (Rounded to 137 mmol/L) at 8/19/2019  2:38 AM  Total Bilirubin: 0.9 mg/dL (Rounded to 1 mg/dL) at 8/19/2019  2:38 AM  INR(ratio): 1.1 at 8/17/2019 11:02 PM  Age: 74 years    Significant Labs:  CBC:  Recent Labs   Lab 08/20/19  1627 08/21/19  0305 08/21/19  1522   WBC 9.19 6.39 5.56   HGB 8.2* 7.9* 7.3*   HCT 23.9* 24.3* 22.1*    141* 123*     CMP:  Recent Labs   Lab 08/20/19  0257 08/21/19  0305    142   K 4.3 3.8    110   CO2 24 25   * 80   BUN 11 10   CREATININE 0.8 0.8   CALCIUM 8.8 8.8   PROT 5.0* 4.7*   ALBUMIN 2.6* 2.6*   BILITOT 0.7 0.9   ALKPHOS 40* 38*   AST 15 14   ALT 13 10   ANIONGAP 9 7*   EGFRNONAA >60.0 >60.0       EGD, CScope 8/19

## 2019-08-22 ENCOUNTER — TELEPHONE (OUTPATIENT)
Dept: GASTROENTEROLOGY | Facility: CLINIC | Age: 74
End: 2019-08-22

## 2019-08-22 LAB
ALBUMIN SERPL BCP-MCNC: 2.5 G/DL (ref 3.5–5.2)
ALP SERPL-CCNC: 44 U/L (ref 55–135)
ALT SERPL W/O P-5'-P-CCNC: 11 U/L (ref 10–44)
ANION GAP SERPL CALC-SCNC: 6 MMOL/L (ref 8–16)
AST SERPL-CCNC: 15 U/L (ref 10–40)
BASOPHILS # BLD AUTO: 0.02 K/UL (ref 0–0.2)
BASOPHILS # BLD AUTO: 0.04 K/UL (ref 0–0.2)
BASOPHILS NFR BLD: 0.3 % (ref 0–1.9)
BASOPHILS NFR BLD: 0.3 % (ref 0–1.9)
BILIRUB SERPL-MCNC: 3.4 MG/DL (ref 0.1–1)
BUN SERPL-MCNC: 14 MG/DL (ref 8–23)
CALCIUM SERPL-MCNC: 8.1 MG/DL (ref 8.7–10.5)
CHLORIDE SERPL-SCNC: 111 MMOL/L (ref 95–110)
CO2 SERPL-SCNC: 26 MMOL/L (ref 23–29)
CREAT SERPL-MCNC: 0.8 MG/DL (ref 0.5–1.4)
DIFFERENTIAL METHOD: ABNORMAL
DIFFERENTIAL METHOD: ABNORMAL
EOSINOPHIL # BLD AUTO: 0.3 K/UL (ref 0–0.5)
EOSINOPHIL # BLD AUTO: 0.5 K/UL (ref 0–0.5)
EOSINOPHIL NFR BLD: 3.6 % (ref 0–8)
EOSINOPHIL NFR BLD: 4.8 % (ref 0–8)
ERYTHROCYTE [DISTWIDTH] IN BLOOD BY AUTOMATED COUNT: 14.4 % (ref 11.5–14.5)
ERYTHROCYTE [DISTWIDTH] IN BLOOD BY AUTOMATED COUNT: 14.6 % (ref 11.5–14.5)
EST. GFR  (AFRICAN AMERICAN): >60 ML/MIN/1.73 M^2
EST. GFR  (NON AFRICAN AMERICAN): >60 ML/MIN/1.73 M^2
GLUCOSE SERPL-MCNC: 89 MG/DL (ref 70–110)
HCT VFR BLD AUTO: 25.6 % (ref 40–54)
HCT VFR BLD AUTO: 26.5 % (ref 40–54)
HGB BLD-MCNC: 8.5 G/DL (ref 14–18)
HGB BLD-MCNC: 8.7 G/DL (ref 14–18)
IMM GRANULOCYTES # BLD AUTO: 0.03 K/UL (ref 0–0.04)
IMM GRANULOCYTES # BLD AUTO: 0.07 K/UL (ref 0–0.04)
IMM GRANULOCYTES NFR BLD AUTO: 0.5 % (ref 0–0.5)
IMM GRANULOCYTES NFR BLD AUTO: 0.5 % (ref 0–0.5)
LYMPHOCYTES # BLD AUTO: 1.5 K/UL (ref 1–4.8)
LYMPHOCYTES # BLD AUTO: 2 K/UL (ref 1–4.8)
LYMPHOCYTES NFR BLD: 14.1 % (ref 18–48)
LYMPHOCYTES NFR BLD: 24.7 % (ref 18–48)
MAGNESIUM SERPL-MCNC: 1.8 MG/DL (ref 1.6–2.6)
MCH RBC QN AUTO: 29.3 PG (ref 27–31)
MCH RBC QN AUTO: 29.6 PG (ref 27–31)
MCHC RBC AUTO-ENTMCNC: 32.8 G/DL (ref 32–36)
MCHC RBC AUTO-ENTMCNC: 33.2 G/DL (ref 32–36)
MCV RBC AUTO: 89 FL (ref 82–98)
MCV RBC AUTO: 89 FL (ref 82–98)
MONOCYTES # BLD AUTO: 0.7 K/UL (ref 0.3–1)
MONOCYTES # BLD AUTO: 0.9 K/UL (ref 0.3–1)
MONOCYTES NFR BLD: 11.2 % (ref 4–15)
MONOCYTES NFR BLD: 6.7 % (ref 4–15)
NEUTROPHILS # BLD AUTO: 10.5 K/UL (ref 1.8–7.7)
NEUTROPHILS # BLD AUTO: 3.4 K/UL (ref 1.8–7.7)
NEUTROPHILS NFR BLD: 58.5 % (ref 38–73)
NEUTROPHILS NFR BLD: 74.8 % (ref 38–73)
NRBC BLD-RTO: 0 /100 WBC
NRBC BLD-RTO: 0 /100 WBC
PHOSPHATE SERPL-MCNC: 3.9 MG/DL (ref 2.7–4.5)
PLATELET # BLD AUTO: 141 K/UL (ref 150–350)
PLATELET # BLD AUTO: 146 K/UL (ref 150–350)
PMV BLD AUTO: 11.8 FL (ref 9.2–12.9)
PMV BLD AUTO: 12 FL (ref 9.2–12.9)
POTASSIUM SERPL-SCNC: 4.1 MMOL/L (ref 3.5–5.1)
PROT SERPL-MCNC: 4.6 G/DL (ref 6–8.4)
RBC # BLD AUTO: 2.87 M/UL (ref 4.6–6.2)
RBC # BLD AUTO: 2.97 M/UL (ref 4.6–6.2)
SODIUM SERPL-SCNC: 143 MMOL/L (ref 136–145)
WBC # BLD AUTO: 14 K/UL (ref 3.9–12.7)
WBC # BLD AUTO: 5.87 K/UL (ref 3.9–12.7)

## 2019-08-22 PROCEDURE — 63600175 PHARM REV CODE 636 W HCPCS: Performed by: STUDENT IN AN ORGANIZED HEALTH CARE EDUCATION/TRAINING PROGRAM

## 2019-08-22 PROCEDURE — 25000003 PHARM REV CODE 250: Performed by: HOSPITALIST

## 2019-08-22 PROCEDURE — C9113 INJ PANTOPRAZOLE SODIUM, VIA: HCPCS | Performed by: STUDENT IN AN ORGANIZED HEALTH CARE EDUCATION/TRAINING PROGRAM

## 2019-08-22 PROCEDURE — 91110 PR GI TRACT CAPSULE ENDOSCOPY: ICD-10-PCS | Mod: 26,GC,, | Performed by: INTERNAL MEDICINE

## 2019-08-22 PROCEDURE — 36415 COLL VENOUS BLD VENIPUNCTURE: CPT

## 2019-08-22 PROCEDURE — 20600001 HC STEP DOWN PRIVATE ROOM

## 2019-08-22 PROCEDURE — 80053 COMPREHEN METABOLIC PANEL: CPT

## 2019-08-22 PROCEDURE — 99232 SBSQ HOSP IP/OBS MODERATE 35: CPT | Mod: ,,, | Performed by: HOSPITALIST

## 2019-08-22 PROCEDURE — 85025 COMPLETE CBC W/AUTO DIFF WBC: CPT | Mod: 91

## 2019-08-22 PROCEDURE — 99232 PR SUBSEQUENT HOSPITAL CARE,LEVL II: ICD-10-PCS | Mod: ,,, | Performed by: HOSPITALIST

## 2019-08-22 PROCEDURE — 84100 ASSAY OF PHOSPHORUS: CPT

## 2019-08-22 PROCEDURE — 91110 GI TRC IMG INTRAL ESOPH-ILE: CPT | Mod: 26,GC,, | Performed by: INTERNAL MEDICINE

## 2019-08-22 PROCEDURE — 83735 ASSAY OF MAGNESIUM: CPT

## 2019-08-22 RX ADMIN — PANTOPRAZOLE SODIUM 40 MG: 40 INJECTION, POWDER, LYOPHILIZED, FOR SOLUTION INTRAVENOUS at 09:08

## 2019-08-22 RX ADMIN — CITALOPRAM HYDROBROMIDE 20 MG: 20 TABLET ORAL at 10:08

## 2019-08-22 RX ADMIN — RAMELTEON 8 MG: 8 TABLET ORAL at 09:08

## 2019-08-22 RX ADMIN — PANTOPRAZOLE SODIUM 40 MG: 40 INJECTION, POWDER, LYOPHILIZED, FOR SOLUTION INTRAVENOUS at 10:08

## 2019-08-22 RX ADMIN — ATORVASTATIN CALCIUM 40 MG: 20 TABLET, FILM COATED ORAL at 10:08

## 2019-08-22 RX ADMIN — TAMSULOSIN HYDROCHLORIDE 0.4 MG: 0.4 CAPSULE ORAL at 10:08

## 2019-08-22 RX ADMIN — FINASTERIDE 5 MG: 5 TABLET, FILM COATED ORAL at 10:08

## 2019-08-22 NOTE — PLAN OF CARE
Problem: Adult Inpatient Plan of Care  Goal: Plan of Care Review  Outcome: Ongoing (interventions implemented as appropriate)  Pt free of falls and injury during shift. POC reviewed with pt VS stable and AAox4. SR on telemetry. Pt received bolus of NS.  Admin 1u of PRBC, has no complaints from infusion. PT NPO at MN. No acute events noted at this time. No complaints. Educated pt why he is at risk for falls and to use call light for assistance ambulating. Yellow non-slip socks on pt. Bed low and locked, call light with in reach. Will continue to monitor.

## 2019-08-22 NOTE — TREATMENT PLAN
Gastroenterology Treatment Plan    Interval History  Double balloon on Monday unremarkable.  Hb dropping 8s -> 7.3. Received 1U PRBC yesterday.  Patient reports no BM after procedure on Monday until one yesterday which was dark black.    Plan  - Will proceed with video capsule today  - hold ASA and Plavix    Thank you for involving us in the care of Jovan Davila. We will continue to follow. Please call with any additional questions, concerns or changes in the patient's clinical status.      Barron Bhat MD  Gastroenterology Fellow, PGY4  [facility]

## 2019-08-22 NOTE — ASSESSMENT & PLAN NOTE
Jovan Davila is a 74 y.o. who is on ASA and plavix and recently three weeks ago started daily mobic who presented to the OSH for dizziness and BRBPR. Patient with recent EGD (although no access to documentation), colonoscopy (with internal hemorrhoids) with no active source of bleed, transferred for capsule enteroscopy vs IR embolization. CT abdomen in OSH with diverticulosis. Intravascular resuscitation/support with IVFs and pRBCs received 4 units before transfer. Underwent double enteroscopy 8/19 without elucidation of source. GI recs as follows:   --Continue PPI 40 mg IV BID  --CBC q12  --can restart plavix and ASA   --has 2 18 gauges   --plan for VCE should bleeding recur.  --Hg trending down 8/21, 8.2-->7.9-->7.3. Patient hypotensive and reporting dizziness. 1u pRBC ordered, 1L bolus ordered. GI alerted.   Transfused 1 u pRBC 8/21 with appropriate response in blood counts. VCE initiated morning of 8/22; results pending.

## 2019-08-22 NOTE — PROGRESS NOTES
[facility] Hospital Medicine  Progress Note    Patient Name: Jovan Davila  MRN: [id number]  Patient Class: IP- Inpatient   Admission Date: 8/17/2019  Length of Stay: 5 days  Attending Physician: Lacie Burgos*  Primary Care Provider: Jewel العلي MD    [facility] Medicine [place] [facility] [place] Lacie Burgos MD    Subjective:     Principal Problem:GI bleed        HPI:  Mr. Davila is a 74 y.o. who is a transfer from [facility] for GIB. Patien has a PMH of CAD s/p CABG ( on ASA, plavix), chronic hip pain ( started mobic three weeks ago ) presented two days ago to [facility] for syncope and bleeding , he had BRBPR and underwent a colonoscopy that revealed bleeding internal hemorrhoids. Was told that he likely needed a capsule enteroscopy was discharged with follow up ( hgb at the time 9) . Today he presented to [facility] for recurrent rectal bleed and intermittent dizziness.          He was found to be  hypotensive 90/52 which improved with lying flat and IV fluid bolus. CT abdomen pelvis with contrast with no source of bleed but shows diverticulosis. Was given 4 units of blood and 2oocc of fluid before transfer      Hgb on arrival was 10, last BM was before arrival             Overview/Hospital Course:  Admitted to the ICU for active GI bleed, on arrival patient HDS with MAP > 70 Hgb 10 . GI consulted with plans for antegrade double balloon enteroscopy 8/19. One blood bowel movement since admission. No transfusions here. Patient stable for step down to hospital medicine.      Interval History: NAEON. Seen and examined at bedside. No reported bloody or melenic bowel movements. Numbers responded appropriately to rbc unit transfused 8/21. VCE planned for today. Currently asymptomatic and eager to go home.     Review of Systems   Constitutional: Negative for appetite change and unexpected weight change.   HENT: Negative for sneezing and voice change.    Eyes: Negative for  discharge and itching.   Respiratory: Negative for choking, chest tightness and shortness of breath.    Cardiovascular: Negative for chest pain and leg swelling.   Gastrointestinal: Positive for blood in stool. Negative for abdominal pain, nausea and rectal pain.   Endocrine: Negative for cold intolerance and heat intolerance.   Genitourinary: Negative for frequency and urgency.   Musculoskeletal: Negative for arthralgias and neck stiffness.   Skin: Negative for pallor and rash.   Neurological: Negative for tremors and light-headedness.   Hematological: Does not bruise/bleed easily.   Psychiatric/Behavioral: Negative for decreased concentration and dysphoric mood.     Objective:     Vital Signs (Most Recent):  Temp: 97 °F (36.1 °C) (08/22/19 1255)  Pulse: (!) 53 (08/22/19 1255)  Resp: 18 (08/22/19 1255)  BP: 126/62 (08/22/19 1255)  SpO2: 98 % (08/22/19 1255) Vital Signs (24h Range):  Temp:  [96.3 °F (35.7 °C)-98.3 °F (36.8 °C)] 97 °F (36.1 °C)  Pulse:  [45-70] 53  Resp:  [14-19] 18  SpO2:  [93 %-98 %] 98 %  BP: ()/(50-77) 126/62     Weight: 87.2 kg (192 lb 5.6 oz)  Body mass index is 30.13 kg/m².    Intake/Output Summary (Last 24 hours) at 8/22/2019 1334  Last data filed at 8/22/2019 0910  Gross per 24 hour   Intake 180 ml   Output 950 ml   Net -770 ml      Physical Exam   Constitutional: He is oriented to person, place, and time. He appears well-developed and well-nourished. Nasal cannula in place.   HENT:   Head: Normocephalic and atraumatic.   Eyes: Pupils are equal, round, and reactive to light. EOM are normal.   Neck: No tracheal deviation present. No thyromegaly present.   Cardiovascular: Regular rhythm. Bradycardia present. Exam reveals no gallop and no friction rub.   No murmur heard.  Pulmonary/Chest: Effort normal and breath sounds normal. No stridor. He has no decreased breath sounds. He has no wheezes. He has no rhonchi. He has no rales.   Abdominal: Soft. Bowel sounds are normal. There is no  tenderness.   Musculoskeletal: Normal range of motion.   Neurological: He is alert and oriented to person, place, and time.   Skin: Skin is warm and dry.   Psychiatric: He has a normal mood and affect. His speech is normal and behavior is normal.       MELD-Na score: 7 at 8/19/2019  2:38 AM  MELD score: 7 at 8/19/2019  2:38 AM  Calculated from:  Serum Creatinine: 0.7 mg/dL (Rounded to 1 mg/dL) at 8/19/2019  2:38 AM  Serum Sodium: 140 mmol/L (Rounded to 137 mmol/L) at 8/19/2019  2:38 AM  Total Bilirubin: 0.9 mg/dL (Rounded to 1 mg/dL) at 8/19/2019  2:38 AM  INR(ratio): 1.1 at 8/17/2019 11:02 PM  Age: 74 years    Significant Labs:  CBC:  Recent Labs   Lab 08/21/19  0305 08/21/19  1522 08/22/19  0334   WBC 6.39 5.56 14.00*   HGB 7.9* 7.3* 8.5*   HCT 24.3* 22.1* 25.6*   * 123* 141*     CMP:  Recent Labs   Lab 08/21/19  0305 08/22/19  0334    143   K 3.8 4.1    111*   CO2 25 26   GLU 80 89   BUN 10 14   CREATININE 0.8 0.8   CALCIUM 8.8 8.1*   PROT 4.7* 4.6*   ALBUMIN 2.6* 2.5*   BILITOT 0.9 3.4*   ALKPHOS 38* 44*   AST 14 15   ALT 10 11   ANIONGAP 7* 6*   EGFRNONAA >60.0 >60.0       EGD, CScope 8/19      Assessment/Plan:      * GI bleed  Jovan Davila is a 74 y.o. who is on ASA and plavix and recently three weeks ago started daily mobic who presented to the OSH for dizziness and BRBPR. Patient with recent EGD (although no access to documentation), colonoscopy (with internal hemorrhoids) with no active source of bleed, transferred for capsule enteroscopy vs IR embolization. CT abdomen in OSH with diverticulosis. Intravascular resuscitation/support with IVFs and pRBCs received 4 units before transfer. Underwent double enteroscopy 8/19 without elucidation of source. GI recs as follows:   --Continue PPI 40 mg IV BID  --CBC q12  --can restart plavix and ASA   --has 2 18 gauges   --plan for VCE should bleeding recur.  --Hg trending down 8/21, 8.2-->7.9-->7.3. Patient hypotensive and reporting dizziness. 1u  pRBC ordered, 1L bolus ordered. GI alerted.   Transfused 1 u pRBC 8/21 with appropriate response in blood counts. VCE initiated morning of 8/22; results pending.     Hypertension  All BP meds held on admission due to concern for acute bleed. Currently normotensive.   Upon med reconciliation, patient stated he'd been taken off of all BP meds, including Imdur, metoprolol, lisinopril. Will refer back to PCP/cardiologist.     Coronary artery disease  Hx of CABG  Initially, DAPT held due to concern for bleeding. Following EGD, resumed 8/20.      VTE Risk Mitigation (From admission, onward)        Ordered     IP VTE HIGH RISK PATIENT  Once      08/17/19 2316     Reason for No Pharmacological VTE Prophylaxis  Once      08/17/19 2316                Lacie Burgos MD  Department of Hospital Medicine   [facility]

## 2019-08-22 NOTE — TELEPHONE ENCOUNTER
Time Out done at bedside.  Instructions for the day reviewed with patient and nurse,JANAE English.  All questions answered to their satisfaction.                             Patient swallowed capsule without incident.

## 2019-08-22 NOTE — SUBJECTIVE & OBJECTIVE
Interval History: NAEON. Seen and examined at bedside. No reported bloody or melenic bowel movements. Numbers responded appropriately to rbc unit transfused 8/21. VCE planned for today. Currently asymptomatic and eager to go home.     Review of Systems   Constitutional: Negative for appetite change and unexpected weight change.   HENT: Negative for sneezing and voice change.    Eyes: Negative for discharge and itching.   Respiratory: Negative for choking, chest tightness and shortness of breath.    Cardiovascular: Negative for chest pain and leg swelling.   Gastrointestinal: Positive for blood in stool. Negative for abdominal pain, nausea and rectal pain.   Endocrine: Negative for cold intolerance and heat intolerance.   Genitourinary: Negative for frequency and urgency.   Musculoskeletal: Negative for arthralgias and neck stiffness.   Skin: Negative for pallor and rash.   Neurological: Negative for tremors and light-headedness.   Hematological: Does not bruise/bleed easily.   Psychiatric/Behavioral: Negative for decreased concentration and dysphoric mood.     Objective:     Vital Signs (Most Recent):  Temp: 97 °F (36.1 °C) (08/22/19 1255)  Pulse: (!) 53 (08/22/19 1255)  Resp: 18 (08/22/19 1255)  BP: 126/62 (08/22/19 1255)  SpO2: 98 % (08/22/19 1255) Vital Signs (24h Range):  Temp:  [96.3 °F (35.7 °C)-98.3 °F (36.8 °C)] 97 °F (36.1 °C)  Pulse:  [45-70] 53  Resp:  [14-19] 18  SpO2:  [93 %-98 %] 98 %  BP: ()/(50-77) 126/62     Weight: 87.2 kg (192 lb 5.6 oz)  Body mass index is 30.13 kg/m².    Intake/Output Summary (Last 24 hours) at 8/22/2019 1334  Last data filed at 8/22/2019 0910  Gross per 24 hour   Intake 180 ml   Output 950 ml   Net -770 ml      Physical Exam   Constitutional: He is oriented to person, place, and time. He appears well-developed and well-nourished. Nasal cannula in place.   HENT:   Head: Normocephalic and atraumatic.   Eyes: Pupils are equal, round, and reactive to light. EOM are normal.    Neck: No tracheal deviation present. No thyromegaly present.   Cardiovascular: Regular rhythm. Bradycardia present. Exam reveals no gallop and no friction rub.   No murmur heard.  Pulmonary/Chest: Effort normal and breath sounds normal. No stridor. He has no decreased breath sounds. He has no wheezes. He has no rhonchi. He has no rales.   Abdominal: Soft. Bowel sounds are normal. There is no tenderness.   Musculoskeletal: Normal range of motion.   Neurological: He is alert and oriented to person, place, and time.   Skin: Skin is warm and dry.   Psychiatric: He has a normal mood and affect. His speech is normal and behavior is normal.       MELD-Na score: 7 at 8/19/2019  2:38 AM  MELD score: 7 at 8/19/2019  2:38 AM  Calculated from:  Serum Creatinine: 0.7 mg/dL (Rounded to 1 mg/dL) at 8/19/2019  2:38 AM  Serum Sodium: 140 mmol/L (Rounded to 137 mmol/L) at 8/19/2019  2:38 AM  Total Bilirubin: 0.9 mg/dL (Rounded to 1 mg/dL) at 8/19/2019  2:38 AM  INR(ratio): 1.1 at 8/17/2019 11:02 PM  Age: 74 years    Significant Labs:  CBC:  Recent Labs   Lab 08/21/19  0305 08/21/19  1522 08/22/19  0334   WBC 6.39 5.56 14.00*   HGB 7.9* 7.3* 8.5*   HCT 24.3* 22.1* 25.6*   * 123* 141*     CMP:  Recent Labs   Lab 08/21/19  0305 08/22/19  0334    143   K 3.8 4.1    111*   CO2 25 26   GLU 80 89   BUN 10 14   CREATININE 0.8 0.8   CALCIUM 8.8 8.1*   PROT 4.7* 4.6*   ALBUMIN 2.6* 2.5*   BILITOT 0.9 3.4*   ALKPHOS 38* 44*   AST 14 15   ALT 10 11   ANIONGAP 7* 6*   EGFRNONAA >60.0 >60.0       EGD, CScope 8/19

## 2019-08-22 NOTE — PLAN OF CARE
Problem: Adult Inpatient Plan of Care  Goal: Plan of Care Review  Outcome: Ongoing (interventions implemented as appropriate)  Pt free from falls and injury during shift. Pt NPO for video capsule, ends @ 1525.  H/H 8.5/25.6. VSS, pt voiced no complaints. POC updated with pt, will continue to monitor.

## 2019-08-23 VITALS
DIASTOLIC BLOOD PRESSURE: 68 MMHG | OXYGEN SATURATION: 97 % | HEART RATE: 60 BPM | HEIGHT: 67 IN | SYSTOLIC BLOOD PRESSURE: 139 MMHG | WEIGHT: 192.38 LBS | TEMPERATURE: 98 F | BODY MASS INDEX: 30.19 KG/M2 | RESPIRATION RATE: 18 BRPM

## 2019-08-23 LAB
ALBUMIN SERPL BCP-MCNC: 2.6 G/DL (ref 3.5–5.2)
ALP SERPL-CCNC: 43 U/L (ref 55–135)
ALT SERPL W/O P-5'-P-CCNC: 11 U/L (ref 10–44)
ANION GAP SERPL CALC-SCNC: 6 MMOL/L (ref 8–16)
AST SERPL-CCNC: 17 U/L (ref 10–40)
BASOPHILS # BLD AUTO: 0.05 K/UL (ref 0–0.2)
BASOPHILS NFR BLD: 0.9 % (ref 0–1.9)
BILIRUB SERPL-MCNC: 1 MG/DL (ref 0.1–1)
BUN SERPL-MCNC: 12 MG/DL (ref 8–23)
CALCIUM SERPL-MCNC: 8.3 MG/DL (ref 8.7–10.5)
CHLORIDE SERPL-SCNC: 111 MMOL/L (ref 95–110)
CO2 SERPL-SCNC: 27 MMOL/L (ref 23–29)
CREAT SERPL-MCNC: 0.8 MG/DL (ref 0.5–1.4)
DIFFERENTIAL METHOD: ABNORMAL
EOSINOPHIL # BLD AUTO: 0.4 K/UL (ref 0–0.5)
EOSINOPHIL NFR BLD: 6.8 % (ref 0–8)
ERYTHROCYTE [DISTWIDTH] IN BLOOD BY AUTOMATED COUNT: 14.7 % (ref 11.5–14.5)
EST. GFR  (AFRICAN AMERICAN): >60 ML/MIN/1.73 M^2
EST. GFR  (NON AFRICAN AMERICAN): >60 ML/MIN/1.73 M^2
GLUCOSE SERPL-MCNC: 95 MG/DL (ref 70–110)
HCT VFR BLD AUTO: 26 % (ref 40–54)
HGB BLD-MCNC: 8.5 G/DL (ref 14–18)
IMM GRANULOCYTES # BLD AUTO: 0.01 K/UL (ref 0–0.04)
IMM GRANULOCYTES NFR BLD AUTO: 0.2 % (ref 0–0.5)
LYMPHOCYTES # BLD AUTO: 1.8 K/UL (ref 1–4.8)
LYMPHOCYTES NFR BLD: 32.7 % (ref 18–48)
MAGNESIUM SERPL-MCNC: 1.8 MG/DL (ref 1.6–2.6)
MCH RBC QN AUTO: 29.4 PG (ref 27–31)
MCHC RBC AUTO-ENTMCNC: 32.7 G/DL (ref 32–36)
MCV RBC AUTO: 90 FL (ref 82–98)
MONOCYTES # BLD AUTO: 0.8 K/UL (ref 0.3–1)
MONOCYTES NFR BLD: 13.8 % (ref 4–15)
NEUTROPHILS # BLD AUTO: 2.5 K/UL (ref 1.8–7.7)
NEUTROPHILS NFR BLD: 45.6 % (ref 38–73)
NRBC BLD-RTO: 0 /100 WBC
PHOSPHATE SERPL-MCNC: 3.9 MG/DL (ref 2.7–4.5)
PLATELET # BLD AUTO: 150 K/UL (ref 150–350)
PMV BLD AUTO: 11.2 FL (ref 9.2–12.9)
POTASSIUM SERPL-SCNC: 4.3 MMOL/L (ref 3.5–5.1)
PROT SERPL-MCNC: 4.9 G/DL (ref 6–8.4)
RBC # BLD AUTO: 2.89 M/UL (ref 4.6–6.2)
SODIUM SERPL-SCNC: 144 MMOL/L (ref 136–145)
WBC # BLD AUTO: 5.57 K/UL (ref 3.9–12.7)

## 2019-08-23 PROCEDURE — 80053 COMPREHEN METABOLIC PANEL: CPT

## 2019-08-23 PROCEDURE — 83735 ASSAY OF MAGNESIUM: CPT

## 2019-08-23 PROCEDURE — 25000003 PHARM REV CODE 250: Performed by: HOSPITALIST

## 2019-08-23 PROCEDURE — 63600175 PHARM REV CODE 636 W HCPCS: Performed by: STUDENT IN AN ORGANIZED HEALTH CARE EDUCATION/TRAINING PROGRAM

## 2019-08-23 PROCEDURE — 36415 COLL VENOUS BLD VENIPUNCTURE: CPT

## 2019-08-23 PROCEDURE — 84100 ASSAY OF PHOSPHORUS: CPT

## 2019-08-23 PROCEDURE — 99239 HOSP IP/OBS DSCHRG MGMT >30: CPT | Mod: ,,, | Performed by: HOSPITALIST

## 2019-08-23 PROCEDURE — 99239 PR HOSPITAL DISCHARGE DAY,>30 MIN: ICD-10-PCS | Mod: ,,, | Performed by: HOSPITALIST

## 2019-08-23 PROCEDURE — 85025 COMPLETE CBC W/AUTO DIFF WBC: CPT

## 2019-08-23 PROCEDURE — C9113 INJ PANTOPRAZOLE SODIUM, VIA: HCPCS | Performed by: STUDENT IN AN ORGANIZED HEALTH CARE EDUCATION/TRAINING PROGRAM

## 2019-08-23 RX ORDER — CLOPIDOGREL BISULFATE 75 MG/1
75 TABLET ORAL DAILY
Qty: 30 TABLET | Refills: 3 | Status: SHIPPED | OUTPATIENT
Start: 2019-08-23 | End: 2019-08-23 | Stop reason: HOSPADM

## 2019-08-23 RX ORDER — CLOPIDOGREL BISULFATE 75 MG/1
75 TABLET ORAL DAILY
Qty: 30 TABLET | Refills: 11
Start: 2019-08-23 | End: 2022-04-27

## 2019-08-23 RX ORDER — ASPIRIN 81 MG/1
81 TABLET ORAL DAILY
COMMUNITY
Start: 2019-08-23

## 2019-08-23 RX ADMIN — CITALOPRAM HYDROBROMIDE 20 MG: 20 TABLET ORAL at 08:08

## 2019-08-23 RX ADMIN — PANTOPRAZOLE SODIUM 40 MG: 40 INJECTION, POWDER, LYOPHILIZED, FOR SOLUTION INTRAVENOUS at 09:08

## 2019-08-23 RX ADMIN — FINASTERIDE 5 MG: 5 TABLET, FILM COATED ORAL at 08:08

## 2019-08-23 RX ADMIN — TAMSULOSIN HYDROCHLORIDE 0.4 MG: 0.4 CAPSULE ORAL at 08:08

## 2019-08-23 RX ADMIN — ATORVASTATIN CALCIUM 40 MG: 20 TABLET, FILM COATED ORAL at 08:08

## 2019-08-23 NOTE — TREATMENT PLAN
Gastroenterology Treatment Plan    Interval History  Video capsule completed - complete study, cecum reached, no active bleeding seen in small intestine, stool throughout colon  Hgb stable 8.5. No BM overnight.    Plan  - s/p double balloon and video capsule (see final note). Prior outside colonoscopy with internal hemorrhoids.  - Ok to resume ASA and Plavix  - No further endoscopic intervention needed at this time    Thank you for involving us in the care of Jovan Davila. We are signing-off. Please call with any additional questions, concerns or changes in the patient's clinical status.      Barron Bhat MD  Gastroenterology Fellow, PGY4  [facility]

## 2019-08-23 NOTE — PLAN OF CARE
Problem: Adult Inpatient Plan of Care  Goal: Plan of Care Review  Outcome: Ongoing (interventions implemented as appropriate)  Pt free of falls and injury during shift. POC reviewed with pt VS stable and AAox4. Bradycardic on telemetry. No BM tonight. No acute events noted at this time. No complaints. Educated pt why he is at risk for falls and to use call light for assistance ambulating. Yellow non-slip socks on pt. Bed low and locked, call light with in reach. Will continue to monitor.

## 2019-08-23 NOTE — DISCHARGE SUMMARY
[facility] Hospital Medicine  Discharge Summary      Patient Name: Jovan Davila  MRN: [id number]  Admission Date: 8/17/2019  Hospital Length of Stay: 6 days  Discharge Date and Time: No discharge date for patient encounter.  Attending Physician: Lacie Burgos*   Discharging Provider: Lacie Burgos MD  Primary Care Provider: Jewel العلي MD  Hospital Medicine Team: [facility] MED A Lacie Burgos MD    HPI:   Mr. Davila is a 74 y.o. who is a transfer from [facility] for [facility]. Patien has a PMH of CAD s/p CABG ( on ASA, plavix), chronic hip pain ( started mobic three weeks ago ) presented two days ago to [facility] [place] for syncope and bleeding , he had BRBPR and underwent a colonoscopy that revealed bleeding internal hemorrhoids. Was told that he likely needed a capsule enteroscopy was discharged with follow up ( hgb at the time 9) . Today he presented to [place] for recurrent rectal bleed and intermittent dizziness.       He was found to be  hypotensive 90/52 which improved with lying flat and IV fluid bolus. CT abdomen pelvis with contrast with no source of bleed but shows diverticulosis. Was given 4 units of blood and 2oocc of fluid before transfer.     Hgb on arrival was 10, last BM was before arrival .    Procedure(s) (LRB):  ENTEROSCOPY, DOUBLE BALLOON, ANTEGRADE (N/A)      Hospital Course:   Admitted to the ICU for active GI bleed, on arrival patient HDS with MAP > 70 Hgb 10 . GI consulted with plans for antegrade double balloon enteroscopy 8/19. One blood bowel movement since admission. No transfusions here. Patient stable for step down to hospital medicine.     Stepped down to [facility]. Underwent double balloon enteroscopy without identification of bleed. Hg trended down on 8/21-8/22 necessitating additional unit of rbc transfused. Underwent VCE on 8/22 which did not identify bleed. Gastroenterology felt that bleed may have been due to internal hemorrhoids. His  hemoglobin remained stable following his transfusion and he did not require further blood products. He was subsequently discharged with instructions to resume his DAPT and f/u with his cardiologist and PCP w/i 1-2 weeks for surveillance.     For details on mgmt of IP problems, please see below:   GI bleed  Jovan Davila is a 74 y.o. who is on ASA and plavix and recently three weeks ago started daily mobic who presented to the OSH for dizziness and BRBPR. Patient with recent EGD (although no access to documentation), colonoscopy (with internal hemorrhoids) with no active source of bleed, transferred for capsule enteroscopy vs IR embolization. CT abdomen in OSH with diverticulosis. Intravascular resuscitation/support with IVFs and pRBCs received 4 units before transfer. 1 additional unit thereafter. Underwent double enteroscopy 8/19 without elucidation of source. GI recs as follows:   --Continue PPI 40 mg IV BID  --CBC q12  --can restart plavix and ASA   --has 2 18 gauges   --plan for VCE should bleeding recur.  --Hg trending down 8/21, 8.2-->7.9-->7.3. Patient hypotensive and reporting dizziness. 1u pRBC ordered, 1L bolus ordered. GI alerted.   Transfused 1 u pRBC 8/21 with appropriate response in blood counts. VCE initiated morning of 8/22; no identification of active bleeding or suspect lesions.   Discharged with instructions to follow up with PCP for surveillance 8/23. Instructed to return to nearest ED should he experience signs/symptoms of recurrent bleeding, including bloody stool, BRBPR, syncope, dizziness/lightheadedness, fatigue, SOB, chest pain or palpitations.      Hypertension  All BP meds held on admission due to concern for acute bleed. Currently normotensive.   Upon med reconciliation, patient stated he'd been taken off of all BP meds, including Imdur, metoprolol, lisinopril. Will refer back to PCP/cardiologist.  Normotensive at discharge.     Coronary artery disease  Hx of CABG  Initially, DAPT held  due to concern for bleeding. Following EGD, resumed 8/20.  Advised patient to follow up with his cardiologist regarding stopping his plavix. His last stent was years ago, however, given his multiple stents and multiple bypass surgeries, he is reticent about stopping his plavix without the recommendation of his cardiologist.      Consults:   Consults (From admission, onward)        Status Ordering Provider     Inpatient consult to Gastroenterology  Once     Provider:  (Not yet assigned)    RUSTAM Samson          No new Assessment & Plan notes have been filed under this hospital service since the last note was generated.  Service: Hospital Medicine    Final Active Diagnoses:    Diagnosis Date Noted POA    PRINCIPAL PROBLEM:  GI bleed [K92.2] 08/17/2019 Yes    Hypertension [I10] 08/17/2019 Yes    Diverticular disease of colon [K57.30] 08/01/2019 Yes     Chronic    Coronary artery disease [I25.10] 10/24/2016 Yes     Chronic      Problems Resolved During this Admission:       Discharged Condition: stable    Disposition: Home or Self Care    Follow Up:  Follow-up Information     Jewel العلي MD. Call in 1 week.    Specialty:  Internal Medicine  Contact information:  [address] [place] [address] [place] [address] [phone]                 Patient Instructions:   No discharge procedures on file.    Significant Diagnostic Studies: Labs:   CMP   Recent Labs   Lab 08/22/19  0334 08/23/19  0329    144   K 4.1 4.3   * 111*   CO2 26 27   GLU 89 95   BUN 14 12   CREATININE 0.8 0.8   CALCIUM 8.1* 8.3*   PROT 4.6* 4.9*   ALBUMIN 2.5* 2.6*   BILITOT 3.4* 1.0   ALKPHOS 44* 43*   AST 15 17   ALT 11 11   ANIONGAP 6* 6*   ESTGFRAFRICA >60.0 >60.0   EGFRNONAA >60.0 >60.0    and CBC   Recent Labs   Lab 08/22/19  0334 08/22/19  1457 08/23/19  0329   WBC 14.00* 5.87 5.57   HGB 8.5* 8.7* 8.5*   HCT 25.6* 26.5* 26.0*   * 146* 150       Pending Diagnostic Studies:     Procedure Component Value Units Date/Time     CBC auto differential [[id number]]     Order Status:  Sent Lab Status:  No result     Specimen:  Blood          Medications:  Reconciled Home Medications:      Medication List      CONTINUE taking these medications    aspirin 81 MG EC tablet  Commonly known as:  ECOTRIN  Take 1 tablet (81 mg total) by mouth once daily.     atorvastatin 40 MG tablet  Commonly known as:  LIPITOR  Take 40 mg by mouth once daily.     citalopram 40 MG tablet  Commonly known as:  CELEXA  Take 20 mg by mouth once daily.     clopidogrel 75 mg tablet  Commonly known as:  PLAVIX  Take 1 tablet (75 mg total) by mouth once daily.     finasteride 5 mg tablet  Commonly known as:  PROSCAR  Take 5 mg by mouth once daily.     multivitamin with minerals tablet  Take 1 tablet by mouth once daily.     omeprazole 40 MG capsule  Commonly known as:  PRILOSEC  Take 40 mg by mouth once daily.     tamsulosin 0.4 mg Cap  Commonly known as:  FLOMAX  Take 0.4 mg by mouth once daily.     temazepam 7.5 MG Cap  Commonly known as:  RESTORIL  Take 15 mg by mouth nightly as needed.        ASK your doctor about these medications    fish oil-omega-3 fatty acids 300-1,000 mg capsule  Take 2 g by mouth once daily.     folic acid 1 MG tablet  Commonly known as:  FOLVITE  Take 1 mg by mouth once daily.     isosorbide mononitrate 120 MG 24 hr tablet  Commonly known as:  IMDUR  Take 120 mg by mouth once daily.     lisinopril 5 MG tablet  Commonly known as:  PRINIVIL,ZESTRIL  Take 5 mg by mouth once daily.     meloxicam 7.5 MG tablet  Commonly known as:  MOBIC  Take 7.5 mg by mouth once daily.     TAMBOCOR 50 MG Tab  Generic drug:  flecainide  Take 100 mg by mouth every 12 (twelve) hours.            Indwelling Lines/Drains at time of discharge:   Lines/Drains/Airways          None          Time spent on the discharge of patient: 40 minutes  Patient was seen and examined on the date of discharge and determined to be suitable for discharge.         Lacie Burgos,  MD  Department of Hospital Medicine  [facility]

## 2019-08-23 NOTE — NURSING
Patient dc per md orders. IV and tele removed. Patient verbalized complete understanding of home care instructions and follow up care. VSS, pt voiced no complaints. Pt awaiting transportation from family.

## 2019-08-24 NOTE — [employer] PATIENT INSTRUCTIONS
Discharge Summary/Instructions for after Video Capsule Endoscopy  Patient Name: Jovan Davila  Patient MRN: [id number]  Patient YOB: 1945 Thursday, August 22, 2019  Nolan Rivas MD  This is a 74 year old male.  1.  Do Not eat or drink anything for 1 hour.  Try sips of water first.  If   tolerated, resume your regular diet or one recommended by your physician.  2.  Do not drive, operate machinery, make critical decisions, or do   activities that require coordination or balance for 24 hours.  3.   You may experience a sore throat for 24 to 48 hours.  You may use   throat lozenges or gargle with warm salt water to relieve the discomfort.  4.  Because air was put into your stomach during the procedure, you may   experience some belching.  5.  Do not use any medication containing aspirin for 10 days.  6.  Go directly to the emergency room if you notice any of the following:   Chills and/or fever over 101 F   Persistent vomiting or vomiting with blood/nasal regurgitation   Severe abdominal pain, other than gas cramps   Severe chest pain   Black, tarry stools     Your doctor recommends these additional instructions:  Your physician has recommended a repeat video capsule endoscopy as needed.  If you have any questions or problems, please call your physician.  EMERGENCY PHONE NUMBER: [phone]  LAB RESULTS: [phone]  Nolan Rivas MD  8/24/2019 2:42:07 PM  This report has been verified and signed electronically.  [employer]

## 2019-08-27 ENCOUNTER — PATIENT OUTREACH (OUTPATIENT)
Dept: ADMINISTRATIVE | Facility: CLINIC | Age: 74
End: 2019-08-27

## 2019-08-27 RX ORDER — AMOXICILLIN 500 MG
CAPSULE ORAL DAILY
COMMUNITY

## 2019-08-27 RX ORDER — FOLIC ACID 0.8 MG
800 TABLET ORAL 2 TIMES DAILY
COMMUNITY

## 2019-08-27 NOTE — PATIENT INSTRUCTIONS
When You Have Gastrointestinal (GI) Bleeding    Blood in your vomit or stool can be a sign of gastrointestinal (GI) bleeding. GI bleeding can be scary. But the cause may not be serious. You should always see a doctor if GI bleeding occurs.  The GI tract  The GI tract is the path through which food travels in the body. Food passes from the mouth down the esophagus (the tube from the mouth to the stomach). Food begins to break down in the stomach. It then moves through the duodenum, the first part of the small intestine. Nutrients are absorbed as food travels through the small intestine. What is left passes into the colon (large intestine) as waste. The colon removes water from the waste. Waste continues from the colon to the rectum (where stool is stored). Waste then leaves the body through the anus.  Causes of GI bleeding  GI bleeding can be caused by many different problems. Some of the more common causes include:  · Swollen veins in the anus (hemorrhoids)  · Swollen veins in the esophagus (varices)  · Sore on the lining of the GI tract (ulcer)  · Cuts or scrapes in the mouth or throat  · Infection caused by germs such as bacteria or parasites  · Food allergies, such as milk allergy in young children  · Medicines  · Inflammation of the GI tract (gastritis or esophagitis)  · Colitis (Crohn's disease or ulcerative colitis)  · Cancer (tumors or polyps)  · Abnormal pouches in the colon (diverticula)  · Tears in the esophagus or anus  · Nosebleed  · Abnormal blood vessels in the GI tract (angiodysplasia)  Diagnosing the cause of blood in stool  If blood is coming out in your stool, you may have a lower GI tract problem or a very fast upper GI tract bleed. Bleeding from the GI tract can be bright red. Or it may look dark and tarry. Tests may also find blood in your stool that cant be seen with the eye (occult blood). To find out the cause, tests that may be ordered include:  · Blood tests. A blood sample is taken and  sent to a lab for exam.  · Hemoccult test. Checks a stool sample for blood.  · Stool culture. Checks a stool sample for bacteria or parasites.  · X-ray, ultrasound, or CT scan. Imaging tests that take pictures of the digestive tract.  · Colonoscopy or sigmoidoscopy. This test uses a flexible tube with a tiny camera. The tube is inserted through your anus into your rectum to see the inside of your colon. Your provider can also take a tiny tissue sample (biopsy) and treat a bleeding source  Diagnosing the cause of blood in vomit  If you are vomiting blood or something that looks like coffee grounds, you may have an upper GI tract problem. To find the cause, tests that may be done include:  · Upper Endoscopy. A flexible tube with a tiny camera is inserted through your mouth and throat to see inside your upper GI tract. This lets your provider take a tiny tissue sample (biopsy) and treat a bleeding source.  · Nasogastric lavage. This can tell if you have upper GI or lower GI bleeding.  · X-ray, ultrasound, or CT scan. Imaging tests that take pictures of your digestive tract.  · Upper GI series. X-rays of the upper part of your GI tract taken from inside your body.  · Enteroscopy. This sends a flexible tube or a small, swallowed capsule camera into your small intestine.  When to call your healthcare provider  Call your healthcare provider right away if you have any of the following:  · Bleeding from your mouth or anus that can't be stopped  · Fever of 100.4°F (38.0°) or higher  · Bleeding along with feeling lightheaded or dizzy  · Signs of fluid loss (dehydration). These include a dry, sticky mouth, decreased urine output; and very dark urine.  · Belly (abdominal) pain   Date Last Reviewed: 7/1/2016  © 5899-0991 [employer]. [address] [place] [address], [place] [address]. All rights reserved. This information is not intended as a substitute for professional medical care. Always follow your healthcare  professional's instructions.

## 2021-01-07 ENCOUNTER — OFFICE VISIT (OUTPATIENT)
Dept: URGENT CARE | Facility: CLINIC | Age: 76
End: 2021-01-07
Payer: MEDICARE

## 2021-01-07 VITALS
DIASTOLIC BLOOD PRESSURE: 87 MMHG | RESPIRATION RATE: 18 BRPM | SYSTOLIC BLOOD PRESSURE: 131 MMHG | OXYGEN SATURATION: 96 % | TEMPERATURE: 98 F | HEART RATE: 53 BPM

## 2021-01-07 DIAGNOSIS — Z20.822 EXPOSURE TO COVID-19 VIRUS: Primary | ICD-10-CM

## 2021-01-07 LAB — SARS-COV-2 AG RESP QL IA.RAPID: NEGATIVE

## 2021-01-07 PROCEDURE — 99213 PR OFFICE/OUTPT VISIT, EST, LEVL III, 20-29 MIN: ICD-10-PCS | Mod: S$GLB,,, | Performed by: NURSE PRACTITIONER

## 2021-01-07 PROCEDURE — 99213 OFFICE O/P EST LOW 20 MIN: CPT | Mod: S$GLB,,, | Performed by: NURSE PRACTITIONER

## 2021-01-07 PROCEDURE — 87426 SARSCOV CORONAVIRUS AG IA: CPT | Mod: QW,S$GLB,, | Performed by: NURSE PRACTITIONER

## 2021-01-07 PROCEDURE — 87426 SARS CORONAVIRUS 2 ANTIGEN POCT: ICD-10-PCS | Mod: QW,S$GLB,, | Performed by: NURSE PRACTITIONER

## 2021-03-24 ENCOUNTER — OFFICE VISIT (OUTPATIENT)
Dept: HEMATOLOGY/ONCOLOGY | Facility: CLINIC | Age: 76
End: 2021-03-24
Payer: MEDICARE

## 2021-03-24 VITALS
SYSTOLIC BLOOD PRESSURE: 126 MMHG | DIASTOLIC BLOOD PRESSURE: 70 MMHG | TEMPERATURE: 98 F | HEART RATE: 56 BPM | BODY MASS INDEX: 33.99 KG/M2 | WEIGHT: 217 LBS

## 2021-03-24 DIAGNOSIS — D63.1 ANEMIA ASSOCIATED WITH STAGE 2 CHRONIC RENAL FAILURE: ICD-10-CM

## 2021-03-24 DIAGNOSIS — D69.6 THROMBOCYTOPENIA, UNSPECIFIED: Primary | ICD-10-CM

## 2021-03-24 DIAGNOSIS — N40.0 PROSTATISM: ICD-10-CM

## 2021-03-24 DIAGNOSIS — D53.9 ANEMIA ASSOCIATED WITH NUTRITIONAL DEFICIENCY: ICD-10-CM

## 2021-03-24 DIAGNOSIS — N18.2 ANEMIA ASSOCIATED WITH STAGE 2 CHRONIC RENAL FAILURE: ICD-10-CM

## 2021-03-24 DIAGNOSIS — D50.0 IRON DEFICIENCY ANEMIA DUE TO CHRONIC BLOOD LOSS: ICD-10-CM

## 2021-03-24 DIAGNOSIS — E07.9 THYROID DYSFUNCTION: ICD-10-CM

## 2021-03-24 PROCEDURE — 99204 OFFICE O/P NEW MOD 45 MIN: CPT | Mod: S$GLB,,, | Performed by: INTERNAL MEDICINE

## 2021-03-24 PROCEDURE — 99204 PR OFFICE/OUTPT VISIT, NEW, LEVL IV, 45-59 MIN: ICD-10-PCS | Mod: S$GLB,,, | Performed by: INTERNAL MEDICINE

## 2021-03-24 NOTE — ED TRIAGE NOTES
"Present to the ER with c/o " Wednesday I went to [place] cause I was bleeding at home I passed out" reports upper and lower GI studies done and pt states " they didn't find anything" reports rectal bleeding today, described as " black" reports dizziness, denies weakness/CP/SOB/denies pain, Hx: diverticulitis   "
24-Mar-2021 18:27

## 2021-03-26 ENCOUNTER — TELEPHONE (OUTPATIENT)
Dept: HEMATOLOGY/ONCOLOGY | Facility: CLINIC | Age: 76
End: 2021-03-26

## 2021-04-22 ENCOUNTER — OFFICE VISIT (OUTPATIENT)
Dept: HEMATOLOGY/ONCOLOGY | Facility: CLINIC | Age: 76
End: 2021-04-22
Payer: MEDICARE

## 2021-04-22 VITALS
WEIGHT: 214 LBS | HEART RATE: 58 BPM | BODY MASS INDEX: 33.52 KG/M2 | TEMPERATURE: 99 F | DIASTOLIC BLOOD PRESSURE: 83 MMHG | SYSTOLIC BLOOD PRESSURE: 176 MMHG

## 2021-04-22 DIAGNOSIS — N18.2 ANEMIA ASSOCIATED WITH STAGE 2 CHRONIC RENAL FAILURE: Primary | ICD-10-CM

## 2021-04-22 DIAGNOSIS — D63.1 ANEMIA ASSOCIATED WITH STAGE 2 CHRONIC RENAL FAILURE: Primary | ICD-10-CM

## 2021-04-22 PROCEDURE — 99214 PR OFFICE/OUTPT VISIT, EST, LEVL IV, 30-39 MIN: ICD-10-PCS | Mod: S$GLB,,, | Performed by: INTERNAL MEDICINE

## 2021-04-22 PROCEDURE — 99214 OFFICE O/P EST MOD 30 MIN: CPT | Mod: S$GLB,,, | Performed by: INTERNAL MEDICINE

## 2021-10-21 ENCOUNTER — OFFICE VISIT (OUTPATIENT)
Dept: HEMATOLOGY/ONCOLOGY | Facility: CLINIC | Age: 76
End: 2021-10-21
Payer: MEDICARE

## 2021-10-21 VITALS
SYSTOLIC BLOOD PRESSURE: 164 MMHG | RESPIRATION RATE: 18 BRPM | BODY MASS INDEX: 32.33 KG/M2 | HEART RATE: 62 BPM | WEIGHT: 206 LBS | DIASTOLIC BLOOD PRESSURE: 90 MMHG | HEIGHT: 67 IN

## 2021-10-21 DIAGNOSIS — D69.6 THROMBOCYTOPENIA, UNSPECIFIED: Primary | ICD-10-CM

## 2021-10-21 PROCEDURE — 99214 OFFICE O/P EST MOD 30 MIN: CPT | Mod: S$GLB,,, | Performed by: INTERNAL MEDICINE

## 2021-10-21 PROCEDURE — 99214 PR OFFICE/OUTPT VISIT, EST, LEVL IV, 30-39 MIN: ICD-10-PCS | Mod: S$GLB,,, | Performed by: INTERNAL MEDICINE

## 2021-10-23 ENCOUNTER — TELEPHONE (OUTPATIENT)
Dept: HEMATOLOGY/ONCOLOGY | Facility: HOSPITAL | Age: 76
End: 2021-10-23
Payer: MEDICARE

## 2022-04-27 ENCOUNTER — OFFICE VISIT (OUTPATIENT)
Dept: HEMATOLOGY/ONCOLOGY | Facility: CLINIC | Age: 77
End: 2022-04-27
Payer: MEDICARE

## 2022-04-27 VITALS
BODY MASS INDEX: 31.39 KG/M2 | DIASTOLIC BLOOD PRESSURE: 86 MMHG | WEIGHT: 200 LBS | HEIGHT: 67 IN | RESPIRATION RATE: 18 BRPM | HEART RATE: 54 BPM | SYSTOLIC BLOOD PRESSURE: 142 MMHG

## 2022-04-27 DIAGNOSIS — D69.6 THROMBOCYTOPENIA, UNSPECIFIED: Primary | ICD-10-CM

## 2022-04-27 PROCEDURE — 99214 OFFICE O/P EST MOD 30 MIN: CPT | Mod: S$GLB,,, | Performed by: INTERNAL MEDICINE

## 2022-04-27 PROCEDURE — 99214 PR OFFICE/OUTPT VISIT, EST, LEVL IV, 30-39 MIN: ICD-10-PCS | Mod: S$GLB,,, | Performed by: INTERNAL MEDICINE

## 2022-04-27 NOTE — PROGRESS NOTES
[facility] hematology Oncology in office Subsequent encounter note  22    Subjective:      Patient ID:   Jovan Davila  77 y.o. male  1945  [place] Henao      Chief Complaint:   Low platelets    HPI:  77 y.o. male referred for evaluation of thrombocytopenia.  He is due to have a colonoscopy soon for the follow-up of colon polyps.  He has history of GI and rectal bleeding complicated by syncope.  He has had extensive GI evaluation in the past.    He has history of coronary artery disease, status post acute myocardial infarction x1.  He has had 4 vessel coronary artery bypass graft surgery on 2 occasions and has multiple stents in place.  He is on aspirin 81 mg daily and Plavix 75 mg daily.  Other history includes sleep apnea syndrome, he does not use the CPAP machine.  He has history of prostatism symptoms with BPH, he is asking if we would check his PSA.  He denies history of jaundice, or hepatitis.    He is status post Dav fundoplasty procedure for hiatal hernia, CABG surgery x2 as above, hernia repair, pseudoaneurysm repair at right inguinal area.    S/P recent stress test, Dr. Grewal.    He has smoked cigarettes and most recently cigars for approximately 60 years.  He does not drink alcohol with regularity.  He denies allergies to medications.  He is a retired .      His mother  of a ruptured cerebral aneurysm.  His father  of heart disease.  He has 3 brothers with heart disease,  2 of whom have  and 1 is due for valve replacement surgery soon.  He has a daughter with lupus, osteoporosis, and DVT.  He has a son who  of heart disease, a son who is an alcoholic, and a daughter who is alive and well.        ROS:   GEN: normal without any fever, night sweats or weight loss  HEENT: normal with no HA's, sore throat, stiff neck, changes in vision  CV: See HPI  PULM: See HPI  GI: See HPI  : See HPI  BREAST: normal with no mass, discharge, pain  SKIN: normal  with no rash, erythema, bruising, or swelling     Past Medical History:   Diagnosis Date    Coronary artery disease     Diverticulosis     Encounter for blood transfusion     Hypertension      Past Surgical History:   Procedure Laterality Date    CARDIAC SURGERY      CABG X2    FUSION, SPINE, MINIMALLY INVASIVE, USING COMPUTER-ASSISTED NAVIGATION N/A 8/19/2019    Procedure: ENTEROSCOPY, DOUBLE BALLOON, ANTEGRADE;  Surgeon: Nolan Rivas MD;  Location: [facility] ([address] [place] [address]);  Service: Endoscopy;  Laterality: N/A;    HERNIA REPAIR      VASCULAR SURGERY      sents X7       Review of patient's allergies indicates:   Allergen Reactions    Mirabegron Shortness Of Breath     Social History     Socioeconomic History    Marital status:    Tobacco Use    Smoking status: Former Smoker     Packs/day: 0.25     Years: 50.00     Pack years: 12.50    Smokeless tobacco: Former User     Quit date: 7/2/2018    Tobacco comment: cigars   Substance and Sexual Activity    Alcohol use: Not Currently    Drug use: No    Sexual activity: Yes     Partners: Female         Current Outpatient Medications:     aspirin (ECOTRIN) 81 MG EC tablet, Take 1 tablet (81 mg total) by mouth once daily., Disp: , Rfl:     atorvastatin (LIPITOR) 40 MG tablet, Take 40 mg by mouth once daily., Disp: , Rfl:     citalopram (CELEXA) 40 MG tablet, Take 20 mg by mouth once daily. , Disp: , Rfl:     clopidogrel (PLAVIX) 75 mg tablet, Take 1 tablet (75 mg total) by mouth once daily., Disp: 30 tablet, Rfl: 11    finasteride (PROSCAR) 5 mg tablet, Take 5 mg by mouth once daily., Disp: , Rfl:     fish oil-omega-3 fatty acids 300-1,000 mg capsule, Take by mouth once daily., Disp: , Rfl:     folic acid (FOLVITE) 800 MCG Tab, Take 800 mcg by mouth 2 (two) times daily., Disp: , Rfl:     multivitamin with minerals tablet, Take 1 tablet by mouth once daily., Disp: , Rfl:     omeprazole (PRILOSEC) 40 MG capsule, Take 40 mg by mouth once daily.,  "Disp: , Rfl:     tamsulosin (FLOMAX) 0.4 mg Cp24, Take 0.4 mg by mouth once daily., Disp: , Rfl:           Objective:   Vitals:  Blood pressure (!) 142/86, pulse (!) 54, resp. rate 18, height 5' 7" (1.702 m), weight 90.7 kg (200 lb).    Physical Examination:   GEN: no apparent distress, comfortable  HEAD: atraumatic and normocephalic  EYES: no pallor, no icterus, no jaundice  ENT:  no pharyngeal erythema, external ears WNL; no nasal discharge  NECK: no masses, thyroid normal, trachea midline, no LAD/LN's, supple  CV: RRR with no murmur; normal pulse; normal S1 and S2  CHEST: Normal respiratory effort; CTAB; distant  breath sounds; no wheeze or crackles  ABDOM: nontender and nondistended; soft; normal bowel sounds; no rebound/guarding, L/S NP  MUSC/Skeletal: ROM normal; no crepitus; joints normal  EXTREM: no clubbing, cyanosis, inflammation or swelling  SKIN:  Multiple ecchymotic areas noted at the left and right forearm.  He has prominent spider veins noted at the lower extremities,  : no cvat  NEURO: grossly intact; motor/sensory WNL; no tremors  PSYCH: normal mood, affect and behavior  LYMPH: normal cervical, supraclavicular, axillary and groin LN's      Labs:    platelet count 139,000    Assessment:   (1) 77 y.o. male with diagnosis of mild thrombocytopenia, previous CT scan showed normal spleen size supporting that he does not have hypersplenism.  Anti-platelet antibodies are negative, this would go against ITP.  Mild thrombocytopenia likely 2nd decreased BM production of platelets.    (2) he has history of GI bleeding, chronic anemia, this may be secondary to iron deficiency, nutritional deficiencies, renal disease, chronic disease.  Hgb 12. Mild anemia 2nd decreased production of Erythropoiten in kidneys.  Procrit or Retacrit not needed for this mild anemia.    (3) history of BPH with prostatism symptoms, PSA NL.    (4)TSH increased 8.75.  Supports hypothyroid.  He will follow up with PMD for " management.    Repeat CBC 6 months, RTC 6 months.    His daughter Niecy Davila has [employer].  Has [place] Syndrome.  She is on B 12 and to go to [facility].  [facility] is not on [employer].  I am not able to see her.  He reports she had colon mass removed, not cancer.  She is to see Dr. Weber.

## 2022-10-24 ENCOUNTER — TELEPHONE (OUTPATIENT)
Dept: HEMATOLOGY/ONCOLOGY | Facility: CLINIC | Age: 77
End: 2022-10-24

## 2022-10-26 ENCOUNTER — OFFICE VISIT (OUTPATIENT)
Dept: HEMATOLOGY/ONCOLOGY | Facility: CLINIC | Age: 77
End: 2022-10-26
Payer: MEDICARE

## 2022-10-26 VITALS
SYSTOLIC BLOOD PRESSURE: 126 MMHG | TEMPERATURE: 98 F | RESPIRATION RATE: 16 BRPM | DIASTOLIC BLOOD PRESSURE: 76 MMHG | HEART RATE: 51 BPM | BODY MASS INDEX: 31.04 KG/M2 | WEIGHT: 198.19 LBS

## 2022-10-26 DIAGNOSIS — D69.6 THROMBOCYTOPENIA, UNSPECIFIED: Primary | Chronic | ICD-10-CM

## 2022-10-26 PROCEDURE — 99214 OFFICE O/P EST MOD 30 MIN: CPT | Mod: S$GLB,,, | Performed by: INTERNAL MEDICINE

## 2022-10-26 PROCEDURE — 99214 PR OFFICE/OUTPT VISIT, EST, LEVL IV, 30-39 MIN: ICD-10-PCS | Mod: S$GLB,,, | Performed by: INTERNAL MEDICINE

## 2022-10-27 NOTE — PROGRESS NOTES
[place] [facility] Oncology in office Subsequent encounter note  10/26/22    Subjective:      Patient ID:   Jovan Davila  77 y.o. male  1945  [place]      Chief Complaint:   Low platelets    HPI:  77 y.o. male referred for evaluation of thrombocytopenia.  He is due to have a colonoscopy soon for the follow-up of colon polyps.  He has history of GI and rectal bleeding complicated by syncope.  He has had extensive GI evaluation in the past.    He has history of coronary artery disease, status post acute myocardial infarction x1.  He has had 4 vessel coronary artery bypass graft surgery on 2 occasions and has multiple stents in place.  He is on aspirin 81 mg daily and Plavix 75 mg daily.  Other history includes sleep apnea syndrome, he does not use the CPAP machine.  He has history of prostatism symptoms with BPH, he is asking if we would check his PSA.  He denies history of jaundice, or hepatitis.    He is status post Dav fundoplasty procedure for hiatal hernia, CABG surgery x2 as above, hernia repair, pseudoaneurysm repair at right inguinal area.    S/P recent stress test, Dr. Grewal.    He has smoked cigarettes and most recently cigars for approximately 60 years.  He does not drink alcohol with regularity.  He denies allergies to medications.  He is a retired .      His mother  of a ruptured cerebral aneurysm.  His father  of heart disease.  He has 3 brothers with heart disease,  2 of whom have  and 1 is due for valve replacement surgery soon.  He has a daughter with lupus, osteoporosis, and DVT.  He has a son who  of heart disease, a son who is an alcoholic, and a daughter who is alive and well.    Last colonoscopy , next due .    Hgb 12.3 to 13.5 now  Plt Cnt 119,000 to 128,000 now.        ROS:   GEN: normal without any fever, night sweats or weight loss  HEENT: normal with no HA's, sore throat, stiff neck, changes in vision  CV: See  HPI  PULM: See HPI  GI: See HPI  : See HPI  BREAST: normal with no mass, discharge, pain  SKIN: normal with no rash, erythema, bruising, or swelling     Past Medical History:   Diagnosis Date    Coronary artery disease     Diverticulosis     Encounter for blood transfusion     Hypertension      Past Surgical History:   Procedure Laterality Date    CARDIAC SURGERY      CABG X2    FUSION, SPINE, MINIMALLY INVASIVE, USING COMPUTER-ASSISTED NAVIGATION N/A 8/19/2019    Procedure: ENTEROSCOPY, DOUBLE BALLOON, ANTEGRADE;  Surgeon: Nolan Rivas MD;  Location: [facility] ([address] [place] [address]);  Service: Endoscopy;  Laterality: N/A;    HERNIA REPAIR      VASCULAR SURGERY      sents X7       Review of patient's allergies indicates:   Allergen Reactions    Mirabegron Shortness Of Breath     Social History     Socioeconomic History    Marital status:    Tobacco Use    Smoking status: Former     Packs/day: 0.25     Years: 50.00     Pack years: 12.50     Types: Cigarettes    Smokeless tobacco: Former     Quit date: 7/2/2018    Tobacco comments:     cigars   Substance and Sexual Activity    Alcohol use: Not Currently    Drug use: No    Sexual activity: Yes     Partners: Female         Current Outpatient Medications:     aspirin (ECOTRIN) 81 MG EC tablet, Take 1 tablet (81 mg total) by mouth once daily., Disp: , Rfl:     atorvastatin (LIPITOR) 40 MG tablet, Take 40 mg by mouth once daily., Disp: , Rfl:     citalopram (CELEXA) 40 MG tablet, Take 20 mg by mouth once daily. , Disp: , Rfl:     finasteride (PROSCAR) 5 mg tablet, Take 5 mg by mouth once daily., Disp: , Rfl:     fish oil-omega-3 fatty acids 300-1,000 mg capsule, Take by mouth once daily., Disp: , Rfl:     folic acid (FOLVITE) 800 MCG Tab, Take 800 mcg by mouth 2 (two) times daily., Disp: , Rfl:     multivitamin with minerals tablet, Take 1 tablet by mouth once daily., Disp: , Rfl:     omeprazole (PRILOSEC) 40 MG capsule, Take 40 mg by mouth once daily., Disp: , Rfl:      tamsulosin (FLOMAX) 0.4 mg Cp24, Take 0.4 mg by mouth once daily., Disp: , Rfl:     clopidogrel (PLAVIX) 75 mg tablet, Take 1 tablet (75 mg total) by mouth once daily., Disp: 30 tablet, Rfl: 11          Objective:   Vitals:  Blood pressure 126/76, pulse (!) 51, temperature 97.7 °F (36.5 °C), resp. rate 16, weight 89.9 kg (198 lb 3.2 oz).    Physical Examination:   GEN: no apparent distress, comfortable  HEAD: atraumatic and normocephalic  EYES: no pallor, no icterus, no jaundice  ENT:  no pharyngeal erythema, external ears WNL; no nasal discharge  NECK: no masses, thyroid normal, trachea midline, no LAD/LN's, supple  CV: RRR with no murmur; normal pulse; normal S1 and S2  CHEST: Normal respiratory effort; CTAB; distant  breath sounds; no wheeze or crackles  ABDOM: nontender and nondistended; soft; normal bowel sounds; no rebound/guarding, L/S NP  MUSC/Skeletal: ROM normal; no crepitus; joints normal  EXTREM: no clubbing, cyanosis, inflammation or swelling  SKIN:  Multiple ecchymotic areas noted at the left and right forearm.  He has prominent spider veins noted at the lower extremities,  : no cvat  NEURO: grossly intact; motor/sensory WNL; no tremors  PSYCH: normal mood, affect and behavior  LYMPH: normal cervical, supraclavicular, axillary and groin LN's      Labs:    platelet count 128,000    Assessment:   (1) 77 y.o. male with diagnosis of mild thrombocytopenia, previous CT scan showed normal spleen size supporting that he does not have hypersplenism.  Anti-platelet antibodies are negative, this would go against ITP.  Mild thrombocytopenia likely 2nd decreased BM production of platelets.    (2) he has history of GI bleeding, chronic anemia, this may be secondary to iron deficiency, nutritional deficiencies, renal disease, chronic disease.  Hgb 12. Mild anemia 2nd decreased production of Erythropoiten in kidneys.  Procrit or Retacrit not needed for this mild anemia.    (3) history of BPH with prostatism  symptoms, PSA NL.    (4)TSH increased 8.75.  Supports hypothyroid.  He will follow up with PMD for management.    Repeat CBC 6 months, RTC 6 months.    His daughter Niecy Davila has [facility].  Has Lambert Ashburn Syndrome.  She is on B 12 and to go to [facility].  [facility] is not on [employer].  I am not able to see her.  He reports she had colon mass removed, not cancer.  She saw Dr. Weber. And is going to [facility].

## 2023-08-14 NOTE — ANESTHESIA PREPROCEDURE EVALUATION
08/19/2019  Jovan Davila is a 74 y.o., male.    Anesthesia Evaluation    I have reviewed the Patient Summary Reports.    I have reviewed the Nursing Notes.   I have reviewed the Medications.     Review of Systems  Anesthesia Hx:  No problems with previous Anesthesia  History of prior surgery of interest to airway management or planning: heart surgery. Previous anesthesia: General Denies Family Hx of Anesthesia complications.   Denies Personal Hx of Anesthesia complications.   Social:  Non-Smoker    Hematology/Oncology:     Oncology Normal    -- Anemia:   EENT/Dental:EENT/Dental Normal   Cardiovascular:   Exercise tolerance: good Hypertension CAD  CABG/stent     Pulmonary:  Pulmonary Normal    Renal/:  Renal/ Normal     Hepatic/GI:   Diverticulosis; dysphagia   Musculoskeletal:  Musculoskeletal Normal    Neurological:  Neurology Normal    Endocrine:  Endocrine Normal    Dermatological:  Skin Normal    Psych:  Psychiatric Normal           Physical Exam  General:  Well nourished, Obesity    Airway/Jaw/Neck:  Airway Findings: Mouth Opening: Small, but > 3cm Tongue: Normal  General Airway Assessment: Adult, Average  Mallampati: III  Improves to II with phonation.  TM Distance: 4 - 6 cm        Eyes/Ears/Nose:  EYES/EARS/NOSE FINDINGS: Normal   Dental:  Dental Findings: Edentulous   Chest/Lungs:  Chest/Lungs Findings: Clear to auscultation, Normal Respiratory Rate     Heart/Vascular:  Heart Findings: Rate: Normal  Rhythm: Regular Rhythm  Sounds: Normal  Heart murmur: negative Vascular Findings: Normal    Abdomen:  Abdomen Findings: Normal    Musculoskeletal:  Musculoskeletal Findings: Normal   Skin:  Skin Findings: Normal    Mental Status:  Mental Status Findings:  Cooperative, Alert and Oriented         Anesthesia Plan  Type of Anesthesia, risks & benefits discussed:  Anesthesia Type:  general  Patient's  Preference:   Intra-op Monitoring Plan: standard ASA monitors  Intra-op Monitoring Plan Comments:   Post Op Pain Control Plan:   Post Op Pain Control Plan Comments:   Induction:   IV  Beta Blocker:  Patient is not currently on a Beta-Blocker (No further documentation required).       Informed Consent: Patient understands risks and agrees with Anesthesia plan.  Questions answered. Anesthesia consent signed with patient.  ASA Score: 3     Day of Surgery Review of History & Physical:            Ready For Surgery From Anesthesia Perspective.        No